# Patient Record
Sex: FEMALE | Race: WHITE | NOT HISPANIC OR LATINO | URBAN - METROPOLITAN AREA
[De-identification: names, ages, dates, MRNs, and addresses within clinical notes are randomized per-mention and may not be internally consistent; named-entity substitution may affect disease eponyms.]

---

## 2018-07-26 ENCOUNTER — OFFICE VISIT - HEALTHEAST (OUTPATIENT)
Dept: FAMILY MEDICINE | Facility: CLINIC | Age: 38
End: 2018-07-26

## 2018-07-26 ENCOUNTER — HOSPITAL ENCOUNTER (OUTPATIENT)
Dept: CT IMAGING | Facility: HOSPITAL | Age: 38
Discharge: HOME | End: 2018-07-26
Attending: FAMILY MEDICINE

## 2018-07-26 DIAGNOSIS — R10.9 LEFT FLANK PAIN: ICD-10-CM

## 2018-07-26 DIAGNOSIS — N20.0 NEPHROLITHIASIS: ICD-10-CM

## 2018-07-26 DIAGNOSIS — R31.0 GROSS HEMATURIA: ICD-10-CM

## 2018-07-26 DIAGNOSIS — Z23 NEED FOR VACCINATION: ICD-10-CM

## 2018-07-26 LAB
ALBUMIN SERPL-MCNC: 4.2 G/DL (ref 3.5–5)
ALBUMIN UR-MCNC: NEGATIVE MG/DL
ALP SERPL-CCNC: 74 U/L (ref 45–120)
ALT SERPL W P-5'-P-CCNC: 14 U/L (ref 0–45)
ANION GAP SERPL CALCULATED.3IONS-SCNC: 10 MMOL/L (ref 5–18)
APPEARANCE UR: CLEAR
AST SERPL W P-5'-P-CCNC: 18 U/L (ref 0–40)
BASOPHILS # BLD AUTO: 0.1 THOU/UL (ref 0–0.2)
BASOPHILS NFR BLD AUTO: 1 % (ref 0–2)
BILIRUB SERPL-MCNC: 0.9 MG/DL (ref 0–1)
BILIRUB UR QL STRIP: NEGATIVE
BUN SERPL-MCNC: 10 MG/DL (ref 8–22)
CALCIUM SERPL-MCNC: 9.7 MG/DL (ref 8.5–10.5)
CHLORIDE BLD-SCNC: 99 MMOL/L (ref 98–107)
CK SERPL-CCNC: 67 U/L (ref 30–190)
CO2 SERPL-SCNC: 28 MMOL/L (ref 22–31)
COLOR UR AUTO: YELLOW
CREAT SERPL-MCNC: 1.43 MG/DL (ref 0.6–1.1)
EOSINOPHIL # BLD AUTO: 0.2 THOU/UL (ref 0–0.4)
EOSINOPHIL NFR BLD AUTO: 1 % (ref 0–6)
ERYTHROCYTE [DISTWIDTH] IN BLOOD BY AUTOMATED COUNT: 10.8 % (ref 11–14.5)
GFR SERPL CREATININE-BSD FRML MDRD: 41 ML/MIN/1.73M2
GLUCOSE BLD-MCNC: 96 MG/DL (ref 70–125)
GLUCOSE UR STRIP-MCNC: NEGATIVE MG/DL
HCT VFR BLD AUTO: 41.1 % (ref 35–47)
HGB BLD-MCNC: 13.2 G/DL (ref 12–16)
HGB UR QL STRIP: NEGATIVE
KETONES UR STRIP-MCNC: ABNORMAL MG/DL
LEUKOCYTE ESTERASE UR QL STRIP: NEGATIVE
LIPASE SERPL-CCNC: 16 U/L (ref 0–52)
LYMPHOCYTES # BLD AUTO: 1.4 THOU/UL (ref 0.8–4.4)
LYMPHOCYTES NFR BLD AUTO: 10 % (ref 20–40)
MCH RBC QN AUTO: 30.7 PG (ref 27–34)
MCHC RBC AUTO-ENTMCNC: 32.2 G/DL (ref 32–36)
MCV RBC AUTO: 95 FL (ref 80–100)
MONOCYTES # BLD AUTO: 0.9 THOU/UL (ref 0–0.9)
MONOCYTES NFR BLD AUTO: 6 % (ref 2–10)
NEUTROPHILS # BLD AUTO: 11.8 THOU/UL (ref 2–7.7)
NEUTROPHILS NFR BLD AUTO: 82 % (ref 50–70)
NITRATE UR QL: NEGATIVE
PH UR STRIP: 6 [PH] (ref 5–8)
PLATELET # BLD AUTO: 197 THOU/UL (ref 140–440)
PMV BLD AUTO: 10 FL (ref 7–10)
POTASSIUM BLD-SCNC: 4.7 MMOL/L (ref 3.5–5)
PROT SERPL-MCNC: 7.1 G/DL (ref 6–8)
RBC # BLD AUTO: 4.31 MILL/UL (ref 3.8–5.4)
SODIUM SERPL-SCNC: 137 MMOL/L (ref 136–145)
SP GR UR STRIP: <=1.005 (ref 1–1.03)
UROBILINOGEN UR STRIP-ACNC: ABNORMAL
WBC: 14.3 THOU/UL (ref 4–11)

## 2018-07-26 ASSESSMENT — MIFFLIN-ST. JEOR: SCORE: 1716.01

## 2018-07-27 ENCOUNTER — COMMUNICATION - HEALTHEAST (OUTPATIENT)
Dept: FAMILY MEDICINE | Facility: CLINIC | Age: 38
End: 2018-07-27

## 2018-07-27 ENCOUNTER — COMMUNICATION - HEALTHEAST (OUTPATIENT)
Dept: UROLOGY | Facility: CLINIC | Age: 38
End: 2018-07-27

## 2018-07-30 ENCOUNTER — OFFICE VISIT - HEALTHEAST (OUTPATIENT)
Dept: UROLOGY | Facility: CLINIC | Age: 38
End: 2018-07-30

## 2018-07-30 DIAGNOSIS — N13.2 HYDRONEPHROSIS WITH URINARY OBSTRUCTION DUE TO URETERAL CALCULUS: ICD-10-CM

## 2018-07-30 DIAGNOSIS — N20.1 CALCULUS OF URETER: ICD-10-CM

## 2018-07-30 DIAGNOSIS — N20.0 CALCULUS OF KIDNEY: ICD-10-CM

## 2018-07-30 DIAGNOSIS — N20.9 URINARY TRACT STONES: ICD-10-CM

## 2018-07-30 LAB
ALBUMIN UR-MCNC: NEGATIVE MG/DL
APPEARANCE UR: CLEAR
BILIRUB UR QL STRIP: NEGATIVE
COLOR UR AUTO: YELLOW
GLUCOSE UR STRIP-MCNC: NEGATIVE MG/DL
HGB UR QL STRIP: NEGATIVE
KETONES UR STRIP-MCNC: NEGATIVE MG/DL
LEUKOCYTE ESTERASE UR QL STRIP: NEGATIVE
NITRATE UR QL: NEGATIVE
PH UR STRIP: 7 [PH] (ref 5–8)
SP GR UR STRIP: <=1.005 (ref 1–1.03)
UROBILINOGEN UR STRIP-ACNC: NORMAL

## 2018-08-03 ENCOUNTER — SURGERY - HEALTHEAST (OUTPATIENT)
Dept: SURGERY | Facility: CLINIC | Age: 38
End: 2018-08-03

## 2018-08-03 ENCOUNTER — ANESTHESIA - HEALTHEAST (OUTPATIENT)
Dept: SURGERY | Facility: CLINIC | Age: 38
End: 2018-08-03

## 2018-08-03 ASSESSMENT — MIFFLIN-ST. JEOR: SCORE: 1702.06

## 2018-08-10 ENCOUNTER — AMBULATORY - HEALTHEAST (OUTPATIENT)
Dept: UROLOGY | Facility: CLINIC | Age: 38
End: 2018-08-10

## 2018-08-10 DIAGNOSIS — N13.2 HYDRONEPHROSIS WITH URINARY OBSTRUCTION DUE TO URETERAL CALCULUS: ICD-10-CM

## 2018-08-10 DIAGNOSIS — N20.1 CALCULUS OF URETER: ICD-10-CM

## 2018-08-10 LAB
ALBUMIN UR-MCNC: ABNORMAL MG/DL
APPEARANCE UR: ABNORMAL
BILIRUB UR QL STRIP: NEGATIVE
COLOR UR AUTO: ABNORMAL
GLUCOSE UR STRIP-MCNC: NEGATIVE MG/DL
HGB UR QL STRIP: ABNORMAL
KETONES UR STRIP-MCNC: NEGATIVE MG/DL
LEUKOCYTE ESTERASE UR QL STRIP: ABNORMAL
NITRATE UR QL: NEGATIVE
PH UR STRIP: 7 [PH] (ref 5–8)
SP GR UR STRIP: 1.02 (ref 1–1.03)
UROBILINOGEN UR STRIP-ACNC: ABNORMAL

## 2018-08-11 LAB — BACTERIA SPEC CULT: NO GROWTH

## 2018-09-10 ENCOUNTER — AMBULATORY - HEALTHEAST (OUTPATIENT)
Dept: LAB | Facility: CLINIC | Age: 38
End: 2018-09-10

## 2018-09-10 ENCOUNTER — HOSPITAL ENCOUNTER (OUTPATIENT)
Dept: CT IMAGING | Facility: CLINIC | Age: 38
Discharge: HOME | End: 2018-09-10
Attending: SPECIALIST

## 2018-09-10 ENCOUNTER — OFFICE VISIT - HEALTHEAST (OUTPATIENT)
Dept: UROLOGY | Facility: CLINIC | Age: 38
End: 2018-09-10

## 2018-09-10 DIAGNOSIS — N20.9 URINARY CALCULUS: ICD-10-CM

## 2018-09-10 DIAGNOSIS — N20.0 CALCULUS OF KIDNEY: ICD-10-CM

## 2018-09-10 DIAGNOSIS — N20.9 URINARY TRACT STONES: ICD-10-CM

## 2018-09-10 DIAGNOSIS — N20.1 CALCULUS OF URETER: ICD-10-CM

## 2018-09-10 LAB
ALBUMIN UR-MCNC: NEGATIVE MG/DL
APPEARANCE UR: CLEAR
BILIRUB UR QL STRIP: NEGATIVE
CALCIUM SERPL-MCNC: 9.1 MG/DL (ref 8.5–10.5)
CALCIUM, IONIZED MEASURED: 1.15 MMOL/L (ref 1.11–1.3)
COLOR UR AUTO: YELLOW
CREAT SERPL-MCNC: 0.74 MG/DL (ref 0.6–1.1)
GFR SERPL CREATININE-BSD FRML MDRD: >60 ML/MIN/1.73M2
GLUCOSE UR STRIP-MCNC: NEGATIVE MG/DL
HGB UR QL STRIP: NEGATIVE
ION CA PH 7.4: 1.11 MMOL/L (ref 1.11–1.3)
KETONES UR STRIP-MCNC: NEGATIVE MG/DL
LEUKOCYTE ESTERASE UR QL STRIP: NEGATIVE
NITRATE UR QL: NEGATIVE
PH UR STRIP: 7.5 [PH] (ref 5–8)
PH: 7.32 (ref 7.35–7.45)
PHOSPHATE SERPL-MCNC: 3 MG/DL (ref 2.5–4.5)
PTH-INTACT SERPL-MCNC: 109 PG/ML (ref 10–86)
SP GR UR STRIP: 1.01 (ref 1–1.03)
UROBILINOGEN UR STRIP-ACNC: NORMAL

## 2018-09-17 ENCOUNTER — AMBULATORY - HEALTHEAST (OUTPATIENT)
Dept: LAB | Facility: CLINIC | Age: 38
End: 2018-09-17

## 2018-09-17 DIAGNOSIS — N20.0 CALCULUS OF KIDNEY: ICD-10-CM

## 2018-09-18 LAB
CALCIUM 24H UR-MRATE: 378 MG/24HR (ref 20–275)
CHLORIDE 24H UR-SRATE: 125 MMOL/24HR (ref 110–250)
CITRATE 24H UR-MCNC: 273 MG/24HR (ref 278–1191)
CREATININE, 24 HR URINE - HISTORICAL: 1904 MG/24HR (ref 800–1800)
MAGNESIUM 24H UR-MRATE: 117 MG/24 HR (ref 75–150)
OXALATE MG/SPEC: 20.8 MG/24HR (ref 7–44)
PH UR STRIP: 6 [PH] (ref 4.5–8)
PHOSPHORUS URINE MG/SPEC: 939.2 MG/24HR (ref 400–1300)
POTASSIUM 24H UR-SCNC: 31 MMOL/24HR (ref 30–90)
SODIUM 24H UR-SRATE: 107 MMOL/24HR (ref 40–217)
SPECIMEN VOL UR: 1600 ML
URIC ACID URINE MG/SPEC: 614 MG/24HR (ref 250–750)

## 2018-09-24 ENCOUNTER — AMBULATORY - HEALTHEAST (OUTPATIENT)
Dept: LAB | Facility: CLINIC | Age: 38
End: 2018-09-24

## 2018-09-24 DIAGNOSIS — N20.0 CALCULUS OF KIDNEY: ICD-10-CM

## 2018-09-25 LAB
CALCIUM 24H UR-MRATE: 474 MG/24HR (ref 20–275)
CHLORIDE 24H UR-SRATE: 292 MMOL/24HR (ref 110–250)
CITRATE 24H UR-MCNC: 636 MG/24HR (ref 278–1191)
CREATININE, 24 HR URINE - HISTORICAL: 1943.5 MG/24HR (ref 800–1800)
MAGNESIUM 24H UR-MRATE: 122 MG/24 HR (ref 75–150)
OXALATE MG/SPEC: 41.2 MG/24HR (ref 7–44)
PH UR STRIP: 6.5 [PH] (ref 4.5–8)
PHOSPHORUS URINE MG/SPEC: 1285.7 MG/24HR (ref 400–1300)
POTASSIUM 24H UR-SCNC: 55 MMOL/24HR (ref 30–90)
SODIUM 24H UR-SRATE: 313 MMOL/24HR (ref 40–217)
SPECIMEN VOL UR: 2300 ML
URIC ACID URINE MG/SPEC: 570 MG/24HR (ref 250–750)

## 2018-10-01 ENCOUNTER — OFFICE VISIT - HEALTHEAST (OUTPATIENT)
Dept: UROLOGY | Facility: CLINIC | Age: 38
End: 2018-10-01

## 2018-10-01 DIAGNOSIS — N20.9 URINARY CALCULUS: ICD-10-CM

## 2018-10-01 DIAGNOSIS — R82.994 HYPERCALCIURIA: ICD-10-CM

## 2018-10-01 DIAGNOSIS — R82.998 HIGH URINE SODIUM: ICD-10-CM

## 2018-10-01 DIAGNOSIS — R34 LOW URINE OUTPUT: ICD-10-CM

## 2018-10-01 DIAGNOSIS — N20.9 CALCULUS OF URINARY TRACT: ICD-10-CM

## 2018-10-01 LAB
ALBUMIN UR-MCNC: NEGATIVE MG/DL
APPEARANCE UR: CLEAR
BILIRUB UR QL STRIP: NEGATIVE
COLOR UR AUTO: YELLOW
GLUCOSE UR STRIP-MCNC: NEGATIVE MG/DL
HGB UR QL STRIP: NEGATIVE
KETONES UR STRIP-MCNC: NEGATIVE MG/DL
LEUKOCYTE ESTERASE UR QL STRIP: ABNORMAL
NITRATE UR QL: NEGATIVE
PH UR STRIP: 7 [PH] (ref 5–8)
SP GR UR STRIP: 1.02 (ref 1–1.03)
UROBILINOGEN UR STRIP-ACNC: ABNORMAL

## 2018-10-29 ENCOUNTER — AMBULATORY - HEALTHEAST (OUTPATIENT)
Dept: LAB | Facility: CLINIC | Age: 38
End: 2018-10-29

## 2018-10-29 DIAGNOSIS — R82.994 HYPERCALCIURIA: ICD-10-CM

## 2018-10-29 DIAGNOSIS — R82.998 HIGH URINE SODIUM: ICD-10-CM

## 2018-10-30 LAB
CALCIUM 24H UR-MRATE: 244 MG/24HR (ref 20–275)
CHLORIDE 24H UR-SRATE: 72 MMOL/24HR (ref 110–250)
CITRATE 24H UR-MCNC: 778 MG/24HR (ref 278–1191)
CREATININE, 24 HR URINE - HISTORICAL: 1827.5 MG/24HR (ref 800–1800)
MAGNESIUM 24H UR-MRATE: 89 MG/24 HR (ref 75–150)
OXALATE MG/SPEC: 36.1 MG/24HR (ref 7–44)
PH UR STRIP: 6.5 [PH] (ref 4.5–8)
PHOSPHORUS URINE MG/SPEC: 722.5 MG/24HR (ref 400–1300)
POTASSIUM 24H UR-SCNC: 95 MMOL/24HR (ref 30–90)
SODIUM 24H UR-SRATE: ABNORMAL MMOL/24HR (ref 40–217)
SPECIMEN VOL UR: 2125 ML
URIC ACID URINE MG/SPEC: 616 MG/24HR (ref 250–750)

## 2018-10-31 ENCOUNTER — COMMUNICATION - HEALTHEAST (OUTPATIENT)
Dept: UROLOGY | Facility: CLINIC | Age: 38
End: 2018-10-31

## 2018-11-06 ENCOUNTER — OFFICE VISIT - HEALTHEAST (OUTPATIENT)
Dept: UROLOGY | Facility: CLINIC | Age: 38
End: 2018-11-06

## 2018-11-06 DIAGNOSIS — N20.9 URINARY TRACT STONES: ICD-10-CM

## 2018-11-06 DIAGNOSIS — R82.994 HYPERCALCIURIA: ICD-10-CM

## 2018-11-06 DIAGNOSIS — R82.998 HIGH URINE SODIUM: ICD-10-CM

## 2018-11-06 DIAGNOSIS — N20.0 CALCULUS OF KIDNEY: ICD-10-CM

## 2018-11-06 LAB
ALBUMIN UR-MCNC: NEGATIVE MG/DL
APPEARANCE UR: CLEAR
BILIRUB UR QL STRIP: NEGATIVE
COLOR UR AUTO: YELLOW
GLUCOSE UR STRIP-MCNC: NEGATIVE MG/DL
HGB UR QL STRIP: NEGATIVE
KETONES UR STRIP-MCNC: NEGATIVE MG/DL
LEUKOCYTE ESTERASE UR QL STRIP: NEGATIVE
NITRATE UR QL: NEGATIVE
PH UR STRIP: 6 [PH] (ref 5–8)
SP GR UR STRIP: 1.02 (ref 1–1.03)
UROBILINOGEN UR STRIP-ACNC: NORMAL

## 2020-02-24 ENCOUNTER — OFFICE VISIT - HEALTHEAST (OUTPATIENT)
Dept: UROLOGY | Facility: CLINIC | Age: 40
End: 2020-02-24

## 2020-02-24 ENCOUNTER — HOSPITAL ENCOUNTER (OUTPATIENT)
Dept: CT IMAGING | Facility: CLINIC | Age: 40
Discharge: HOME | End: 2020-02-24
Attending: PHYSICIAN ASSISTANT

## 2020-02-24 DIAGNOSIS — N20.0 CALCULUS OF KIDNEY: ICD-10-CM

## 2020-02-24 DIAGNOSIS — Z87.442 HISTORY OF KIDNEY STONES: ICD-10-CM

## 2020-02-24 LAB
ALBUMIN UR-MCNC: NEGATIVE MG/DL
APPEARANCE UR: CLEAR
BILIRUB UR QL STRIP: NEGATIVE
COLOR UR AUTO: YELLOW
GLUCOSE UR STRIP-MCNC: NEGATIVE MG/DL
HGB UR QL STRIP: ABNORMAL
KETONES UR STRIP-MCNC: NEGATIVE MG/DL
LEUKOCYTE ESTERASE UR QL STRIP: NEGATIVE
NITRATE UR QL: NEGATIVE
PH UR STRIP: 7 [PH] (ref 5–8)
SP GR UR STRIP: 1.02 (ref 1–1.03)
UROBILINOGEN UR STRIP-ACNC: ABNORMAL

## 2020-03-02 ENCOUNTER — AMBULATORY - HEALTHEAST (OUTPATIENT)
Dept: LAB | Facility: CLINIC | Age: 40
End: 2020-03-02

## 2020-03-02 DIAGNOSIS — N20.0 CALCULUS OF KIDNEY: ICD-10-CM

## 2020-03-02 LAB
MAGNESIUM 24H UR-MRATE: 165 MG/24 HR (ref 75–150)
MAGNESIUM UR-MCNC: 7.1 MG/DL
SPECIMEN VOL UR: 2325 ML

## 2020-03-04 LAB
CALCIUM 24H UR-MRATE: 419 MG/24HR (ref 20–275)
CHLORIDE 24H UR-SRATE: 112 MMOL/24HR (ref 110–250)
CITRATE 24H UR-MCNC: 535 MG/24HR
CREATININE, 24 HR URINE - HISTORICAL: 1876.3 MG/24HR
OXALATE MG/SPEC: 34.9 MG/24HR (ref 7–44)
PH UR STRIP: 6 [PH] (ref 4.5–8)
PHOSPHORUS URINE MG/SPEC: 1634.5 MG/24HR
POTASSIUM 24H UR-SCNC: 46 MMOL/24HR (ref 30–90)
SODIUM 24H UR-SRATE: 130 MMOL/24HR (ref 40–217)
SPECIMEN VOL UR: 2325 ML
URIC ACID URINE MG/SPEC: 684 MG/24HR (ref 250–750)

## 2020-03-09 ENCOUNTER — OFFICE VISIT - HEALTHEAST (OUTPATIENT)
Dept: UROLOGY | Facility: CLINIC | Age: 40
End: 2020-03-09

## 2020-03-09 DIAGNOSIS — R82.994 HYPERCALCIURIA: ICD-10-CM

## 2020-03-09 DIAGNOSIS — Z87.442 HISTORY OF KIDNEY STONES: ICD-10-CM

## 2020-03-09 DIAGNOSIS — N20.0 CALCULUS OF KIDNEY: ICD-10-CM

## 2020-03-09 LAB
ALBUMIN UR-MCNC: NEGATIVE MG/DL
APPEARANCE UR: CLEAR
BILIRUB UR QL STRIP: NEGATIVE
COLOR UR AUTO: YELLOW
GLUCOSE UR STRIP-MCNC: NEGATIVE MG/DL
HGB UR QL STRIP: ABNORMAL
KETONES UR STRIP-MCNC: NEGATIVE MG/DL
LEUKOCYTE ESTERASE UR QL STRIP: NEGATIVE
NITRATE UR QL: NEGATIVE
PH UR STRIP: 7 [PH] (ref 5–8)
SP GR UR STRIP: 1.01 (ref 1–1.03)
UROBILINOGEN UR STRIP-ACNC: ABNORMAL

## 2020-06-02 ENCOUNTER — COMMUNICATION - HEALTHEAST (OUTPATIENT)
Dept: UROLOGY | Facility: CLINIC | Age: 40
End: 2020-06-02

## 2020-06-02 DIAGNOSIS — R82.994 HYPERCALCIURIA: ICD-10-CM

## 2020-06-02 DIAGNOSIS — N20.0 CALCULUS OF KIDNEY: ICD-10-CM

## 2020-06-03 ENCOUNTER — COMMUNICATION - HEALTHEAST (OUTPATIENT)
Dept: UROLOGY | Facility: CLINIC | Age: 40
End: 2020-06-03

## 2020-06-03 DIAGNOSIS — N20.0 CALCULUS OF KIDNEY: ICD-10-CM

## 2020-06-03 DIAGNOSIS — R82.994 HYPERCALCIURIA: ICD-10-CM

## 2020-10-13 ENCOUNTER — COMMUNICATION - HEALTHEAST (OUTPATIENT)
Dept: UROLOGY | Facility: CLINIC | Age: 40
End: 2020-10-13

## 2020-10-14 ENCOUNTER — AMBULATORY - HEALTHEAST (OUTPATIENT)
Dept: LAB | Facility: HOSPITAL | Age: 40
End: 2020-10-14

## 2020-10-14 ENCOUNTER — COMMUNICATION - HEALTHEAST (OUTPATIENT)
Dept: UROLOGY | Facility: CLINIC | Age: 40
End: 2020-10-14

## 2020-10-14 DIAGNOSIS — R82.994 HYPERCALCIURIA: ICD-10-CM

## 2020-10-14 DIAGNOSIS — N20.0 CALCULUS OF KIDNEY: ICD-10-CM

## 2020-10-14 DIAGNOSIS — E87.6 HYPOKALEMIA: ICD-10-CM

## 2020-10-14 LAB
ALBUMIN SERPL-MCNC: 4.2 G/DL (ref 3.5–5)
ANION GAP SERPL CALCULATED.3IONS-SCNC: 13 MMOL/L (ref 5–18)
BUN SERPL-MCNC: 10 MG/DL (ref 8–22)
CALCIUM SERPL-MCNC: 9.3 MG/DL (ref 8.5–10.5)
CHLORIDE BLD-SCNC: 97 MMOL/L (ref 98–107)
CO2 SERPL-SCNC: 29 MMOL/L (ref 22–31)
CREAT SERPL-MCNC: 0.89 MG/DL (ref 0.6–1.1)
GFR SERPL CREATININE-BSD FRML MDRD: >60 ML/MIN/1.73M2
GLUCOSE BLD-MCNC: 119 MG/DL (ref 70–125)
MAGNESIUM 24H UR-MRATE: 183 MG/24 HR (ref 75–150)
MAGNESIUM UR-MCNC: 8.5 MG/DL
PHOSPHATE SERPL-MCNC: 2.6 MG/DL (ref 2.5–4.5)
POTASSIUM BLD-SCNC: 2.8 MMOL/L (ref 3.5–5)
SODIUM SERPL-SCNC: 139 MMOL/L (ref 136–145)
SPECIMEN VOL UR: 2150 ML

## 2020-10-16 ENCOUNTER — AMBULATORY - HEALTHEAST (OUTPATIENT)
Dept: LAB | Facility: HOSPITAL | Age: 40
End: 2020-10-16

## 2020-10-16 DIAGNOSIS — E87.6 HYPOKALEMIA: ICD-10-CM

## 2020-10-16 LAB
ALBUMIN SERPL-MCNC: 3.7 G/DL (ref 3.5–5)
ANION GAP SERPL CALCULATED.3IONS-SCNC: 8 MMOL/L (ref 5–18)
BUN SERPL-MCNC: 9 MG/DL (ref 8–22)
CALCIUM SERPL-MCNC: 8.4 MG/DL (ref 8.5–10.5)
CHLORIDE BLD-SCNC: 103 MMOL/L (ref 98–107)
CO2 SERPL-SCNC: 30 MMOL/L (ref 22–31)
CREAT SERPL-MCNC: 0.75 MG/DL (ref 0.6–1.1)
GFR SERPL CREATININE-BSD FRML MDRD: >60 ML/MIN/1.73M2
GLUCOSE BLD-MCNC: 106 MG/DL (ref 70–125)
PHOSPHATE SERPL-MCNC: 1.8 MG/DL (ref 2.5–4.5)
POTASSIUM BLD-SCNC: 3.5 MMOL/L (ref 3.5–5)
SODIUM SERPL-SCNC: 141 MMOL/L (ref 136–145)

## 2020-10-20 LAB
CALCIUM 24H UR-MRATE: 239 MG/24HR (ref 20–275)
CHLORIDE 24H UR-SRATE: 103 MMOL/24HR (ref 110–250)
CITRATE 24H UR-MCNC: 79 MG/24HR
CREATININE, 24 HR URINE - HISTORICAL: 1588.9 MG/24HR
OXALATE MG/SPEC: 36.3 MG/24HR (ref 7–44)
PH UR STRIP: 7 [PH] (ref 4.5–8)
PHOSPHORUS URINE MG/SPEC: 931 MG/24HR
POTASSIUM 24H UR-SCNC: 59 MMOL/24HR (ref 30–90)
SODIUM 24H UR-SRATE: 71 MMOL/24HR (ref 40–217)
SPECIMEN VOL UR: 2150 ML
URIC ACID URINE MG/SPEC: 628 MG/24HR (ref 250–750)

## 2020-10-21 ENCOUNTER — OFFICE VISIT - HEALTHEAST (OUTPATIENT)
Dept: UROLOGY | Facility: CLINIC | Age: 40
End: 2020-10-21

## 2020-10-21 DIAGNOSIS — E87.6 DRUG-INDUCED HYPOKALEMIA: ICD-10-CM

## 2020-10-21 DIAGNOSIS — R82.994 HYPERCALCIURIA: ICD-10-CM

## 2020-10-21 DIAGNOSIS — T50.905A DRUG-INDUCED HYPOKALEMIA: ICD-10-CM

## 2020-10-21 DIAGNOSIS — N20.0 CALCULUS OF KIDNEY: ICD-10-CM

## 2020-10-21 DIAGNOSIS — R82.991 HYPOCITRATURIA: ICD-10-CM

## 2020-10-28 ENCOUNTER — AMBULATORY - HEALTHEAST (OUTPATIENT)
Dept: UROLOGY | Facility: CLINIC | Age: 40
End: 2020-10-28

## 2020-10-28 ENCOUNTER — AMBULATORY - HEALTHEAST (OUTPATIENT)
Dept: LAB | Facility: HOSPITAL | Age: 40
End: 2020-10-28

## 2020-10-28 ENCOUNTER — COMMUNICATION - HEALTHEAST (OUTPATIENT)
Dept: UROLOGY | Facility: CLINIC | Age: 40
End: 2020-10-28

## 2020-10-28 DIAGNOSIS — E87.6 HYPOKALEMIA: ICD-10-CM

## 2020-10-28 DIAGNOSIS — R82.991 HYPOCITRATURIA: ICD-10-CM

## 2020-10-28 LAB
ALBUMIN SERPL-MCNC: 4.2 G/DL (ref 3.5–5)
ANION GAP SERPL CALCULATED.3IONS-SCNC: 10 MMOL/L (ref 5–18)
BUN SERPL-MCNC: 12 MG/DL (ref 8–22)
CALCIUM SERPL-MCNC: 9.3 MG/DL (ref 8.5–10.5)
CHLORIDE BLD-SCNC: 99 MMOL/L (ref 98–107)
CO2 SERPL-SCNC: 29 MMOL/L (ref 22–31)
CREAT SERPL-MCNC: 0.88 MG/DL (ref 0.6–1.1)
GFR SERPL CREATININE-BSD FRML MDRD: >60 ML/MIN/1.73M2
GLUCOSE BLD-MCNC: 143 MG/DL (ref 70–125)
PHOSPHATE SERPL-MCNC: 3.1 MG/DL (ref 2.5–4.5)
POTASSIUM BLD-SCNC: 3 MMOL/L (ref 3.5–5)
SODIUM SERPL-SCNC: 138 MMOL/L (ref 136–145)

## 2020-11-03 ENCOUNTER — AMBULATORY - HEALTHEAST (OUTPATIENT)
Dept: LAB | Facility: HOSPITAL | Age: 40
End: 2020-11-03

## 2020-11-03 DIAGNOSIS — E87.6 HYPOKALEMIA: ICD-10-CM

## 2020-11-03 LAB — POTASSIUM BLD-SCNC: 3.4 MMOL/L (ref 3.5–5)

## 2020-11-16 ENCOUNTER — AMBULATORY - HEALTHEAST (OUTPATIENT)
Dept: LAB | Facility: HOSPITAL | Age: 40
End: 2020-11-16

## 2020-11-16 ENCOUNTER — AMBULATORY - HEALTHEAST (OUTPATIENT)
Dept: UROLOGY | Facility: CLINIC | Age: 40
End: 2020-11-16

## 2020-11-16 DIAGNOSIS — E87.6 HYPOKALEMIA: ICD-10-CM

## 2020-11-16 LAB — POTASSIUM BLD-SCNC: 3.6 MMOL/L (ref 3.5–5)

## 2020-11-17 ENCOUNTER — OFFICE VISIT - HEALTHEAST (OUTPATIENT)
Dept: UROLOGY | Facility: CLINIC | Age: 40
End: 2020-11-17

## 2020-11-17 DIAGNOSIS — R82.994 HYPERCALCIURIA: ICD-10-CM

## 2020-11-17 DIAGNOSIS — R82.991 HYPOCITRATURIA: ICD-10-CM

## 2020-11-17 DIAGNOSIS — N20.0 CALCULUS OF KIDNEY: ICD-10-CM

## 2020-11-17 DIAGNOSIS — E87.6 HYPOKALEMIA: ICD-10-CM

## 2020-12-02 ENCOUNTER — AMBULATORY - HEALTHEAST (OUTPATIENT)
Dept: LAB | Facility: HOSPITAL | Age: 40
End: 2020-12-02

## 2020-12-02 DIAGNOSIS — E87.6 HYPOKALEMIA: ICD-10-CM

## 2020-12-02 LAB
ALBUMIN SERPL-MCNC: 3.9 G/DL (ref 3.5–5)
ANION GAP SERPL CALCULATED.3IONS-SCNC: 9 MMOL/L (ref 5–18)
BUN SERPL-MCNC: 13 MG/DL (ref 8–22)
CALCIUM SERPL-MCNC: 8.8 MG/DL (ref 8.5–10.5)
CHLORIDE BLD-SCNC: 99 MMOL/L (ref 98–107)
CO2 SERPL-SCNC: 32 MMOL/L (ref 22–31)
CREAT SERPL-MCNC: 0.81 MG/DL (ref 0.6–1.1)
GFR SERPL CREATININE-BSD FRML MDRD: >60 ML/MIN/1.73M2
GLUCOSE BLD-MCNC: 89 MG/DL (ref 70–125)
PHOSPHATE SERPL-MCNC: 2.9 MG/DL (ref 2.5–4.5)
POTASSIUM BLD-SCNC: 3.5 MMOL/L (ref 3.5–5)
SODIUM SERPL-SCNC: 140 MMOL/L (ref 136–145)

## 2020-12-17 ENCOUNTER — COMMUNICATION - HEALTHEAST (OUTPATIENT)
Dept: UROLOGY | Facility: CLINIC | Age: 40
End: 2020-12-17

## 2020-12-17 DIAGNOSIS — E87.6 HYPOKALEMIA: ICD-10-CM

## 2020-12-22 ENCOUNTER — OFFICE VISIT - HEALTHEAST (OUTPATIENT)
Dept: UROLOGY | Facility: CLINIC | Age: 40
End: 2020-12-22

## 2020-12-22 DIAGNOSIS — R82.991 HYPOCITRATURIA: ICD-10-CM

## 2020-12-22 DIAGNOSIS — N20.0 CALCULUS OF KIDNEY: ICD-10-CM

## 2020-12-22 RX ORDER — POTASSIUM CITRATE 10 MEQ/1
20 TABLET, EXTENDED RELEASE ORAL DAILY
Qty: 360 TABLET | Refills: 0 | Status: SHIPPED
Start: 2020-12-22 | End: 2021-06-20

## 2021-05-27 VITALS — HEART RATE: 69 BPM | TEMPERATURE: 97.9 F | SYSTOLIC BLOOD PRESSURE: 146 MMHG | DIASTOLIC BLOOD PRESSURE: 107 MMHG

## 2021-06-01 VITALS — HEIGHT: 68 IN | BODY MASS INDEX: 33.65 KG/M2 | WEIGHT: 222.06 LBS

## 2021-06-01 VITALS — WEIGHT: 221.44 LBS | HEIGHT: 67 IN | BODY MASS INDEX: 34.75 KG/M2

## 2021-06-04 VITALS
HEART RATE: 60 BPM | DIASTOLIC BLOOD PRESSURE: 97 MMHG | SYSTOLIC BLOOD PRESSURE: 150 MMHG | BODY MASS INDEX: 35.71 KG/M2 | TEMPERATURE: 97.9 F | WEIGHT: 228 LBS

## 2021-06-06 NOTE — PROGRESS NOTES
Assessment/Plan:        Diagnoses and all orders for this visit:    Calculus of kidney  -     Magnesium, 24 Hour Urine; Future; Expected date: 04/08/2020  -     Stone Formation, 24 Hour Urine (does not include Magnesium); Future; Expected date: 04/08/2020  -     Renal Function Profile; Future; Expected date: 04/08/2020  -     chlorthalidone (HYGROTEN) 25 MG tablet; Take 1 tablet (25 mg total) by mouth daily.  Dispense: 30 tablet; Refill: 2  -     Patient Stated Goal: Prevent further stones  -     Chlorthalidone Education    History of kidney stones  -     Urinalysis Macroscopic    Hypercalciuria  -     Magnesium, 24 Hour Urine; Future; Expected date: 04/08/2020  -     Stone Formation, 24 Hour Urine (does not include Magnesium); Future; Expected date: 04/08/2020  -     Renal Function Profile; Future; Expected date: 04/08/2020  -     chlorthalidone (HYGROTEN) 25 MG tablet; Take 1 tablet (25 mg total) by mouth daily.  Dispense: 30 tablet; Refill: 2  -     Patient Stated Goal: Prevent further stones  -     Chlorthalidone Education  -     Ambulatory referral to Nutrition Services      Stone Management Plan  [facility] Stone Management 11/6/2018 2/24/2020 3/9/2020   Urinary Tract Infection No suspicion of infection No suspicion of infection No suspicion of infection   Renal Colic Asymptomatic at this time Asymptomatic at this time Asymptomatic at this time   Renal Failure No suspicion of renal failure No suspicion of renal failure No suspicion of renal failure   Current CT date - 2/24/2020 -   Right sided stones? - Yes -   R Number of ureteral stones - No ureteral stones -   R Number of kidney stones  - Renal stones unchanged from last exam -   R GSD of kidney stones - < 2 -   R Hydronephrosis - None -   R Stone Event No current event No current event No current event   R Current Plan Observe Observe -   Observe rationale Limited stone burden with good prognosis for spontaneous passage Limited stone burden with good prognosis  for spontaneous passage -   Left sided stones? - No -   L Number of ureteral stones - - -   L GSD of ureteral stones - - -   L Location of ureteral stone - - -   L Number of kidney stones  - - -   L GSD of kidney stones - - -   L Hydronephrosis - None -   L Stone Event No current event New stone passed prior to visit No current event   Diagnosis date - - -   Initial location of primary symptomatic stone - - -   Initial GSD of primary symptomatic stone - 5 -   Resolved date - 1/26/2020 -   Post-op status - - -   L Current Plan - - -   Clear rationale - - -             Subjective:      HPI  Ms. Kami Gilman is a 40 y.o.  female returning to the [facility] [place] for follow up of her stone disease.    She has provided a 24 hour urine specimen that demonstrates significant hypercalciuria (410) without high urine sodium.    Will start chlorthalidone 25 daily.     We discussed importance of maintaining low sodium diet. She would like to meet with a nutritionist to merge stone reduction with other dietary goals.    Will see her back with 24 hour urine on therapy     ROS   Review of systems is negative except for HPI.    Past Medical History:   Diagnosis Date     Kidney stone      Motion sickness        Past Surgical History:   Procedure Laterality Date     NO PAST SURGERIES       [facility] CYSTO/URETERO W/LITHOTRIPSY &INDWELL STENT INSRT Left 8/3/2018    Procedure: CYSTOSCOPY, LEFT URETEROSCOPY, LASER LITHOTRIPSY STENT INSERTION;  Surgeon: Prasanna Nelson MD;  Location: [facility];  Service: Urology     WISDOM TOOTH EXTRACTION         Current Outpatient Medications   Medication Sig Dispense Refill     chlorthalidone (HYGROTEN) 25 MG tablet Take 1 tablet (25 mg total) by mouth daily. 30 tablet 2     No current facility-administered medications for this visit.        Allergies   Allergen Reactions     Amoxicillin Rash       Social History     Socioeconomic History     Marital status:       Spouse name: Not on file     Number of children: Not on file     Years of education: Not on file     Highest education level: Not on file   Occupational History     Occupation: Graphic Design   Social Needs     Financial resource strain: Not on file     Food insecurity     Worry: Not on file     Inability: Not on file     Transportation needs     Medical: Not on file     Non-medical: Not on file   Tobacco Use     Smoking status: Never Smoker     Smokeless tobacco: Never Used   Substance and Sexual Activity     Alcohol use: Yes     Comment: socially 2 weekly     Drug use: No     Sexual activity: Not on file   Lifestyle     Physical activity     Days per week: Not on file     Minutes per session: Not on file     Stress: Not on file   Relationships     Social connections     Talks on phone: Not on file     Gets together: Not on file     Attends Mandaeism service: Not on file     Active member of club or organization: Not on file     Attends meetings of clubs or organizations: Not on file     Relationship status: Not on file     Intimate partner violence     Fear of current or ex partner: Not on file     Emotionally abused: Not on file     Physically abused: Not on file     Forced sexual activity: Not on file   Other Topics Concern     Not on file   Social History Narrative     Not on file       Family History   Problem Relation Age of Onset     Kidney cancer Maternal Aunt      Heart disease Paternal Grandmother      Urolithiasis Neg Hx      Clotting disorder Neg Hx      Gout Neg Hx      Objective:      Physical Exam  Vitals:    03/09/20 1545   BP: (!) 146/107   Pulse: 69   Temp: 97.9  F (36.6  C)     General - well developed, well nourished, appropriate for age. Appears no distress at this time  Abdomen - moderately obese soft, non-tender, no hepatosplenomegaly, no masses.   - no flank tenderness, no suprapubic tenderness, kidney and bladder non-palpable  MSK - normal spinal curvature. no spinal tenderness. normal  gait. muscular strength intact.  Psych - oriented to time, place, and person, normal mood and affect.      Labs   Urinalysis POC (Office):  Nitrite, UA   Date Value Ref Range Status   03/09/2020 Negative Negative Final   02/24/2020 Negative Negative Final   11/06/2018 Negative Negative Final       Lab Urinalysis:  Blood, UA   Date Value Ref Range Status   03/09/2020 Small (!) Negative Final   02/24/2020 Trace (!) Negative Final   11/06/2018 Negative Negative Final     Nitrite, UA   Date Value Ref Range Status   03/09/2020 Negative Negative Final   02/24/2020 Negative Negative Final   11/06/2018 Negative Negative Final     Leukocytes, UA   Date Value Ref Range Status   03/09/2020 Negative Negative Final   02/24/2020 Negative Negative Final   11/06/2018 Negative Negative Final     pH, UA   Date Value Ref Range Status   03/09/2020 7.0 5.0 - 8.0 Final   02/24/2020 7.0 5.0 - 8.0 Final   11/06/2018 6.0 5.0 - 8.0 Final    and Stone prevention labs   Serum chemistries   Creatinine   Date Value Ref Range Status   09/10/2018 0.74 0.60 - 1.10 mg/dL Final   07/26/2018 1.43 (H) 0.60 - 1.10 mg/dL Final     Potassium   Date Value Ref Range Status   07/26/2018 4.7 3.5 - 5.0 mmol/L Final     Calcium   Date Value Ref Range Status   09/10/2018 9.1 8.5 - 10.5 mg/dL Final   07/26/2018 9.7 8.5 - 10.5 mg/dL Final     Phosphorus   Date Value Ref Range Status   09/10/2018 3.0 2.5 - 4.5 mg/dL Final   , Calcium metabolism   Calcium, Ionized Measured   Date Value Ref Range Status   09/10/2018 1.15 1.11 - 1.30 mmol/L Final      PTH   Date Value Ref Range Status   09/10/2018 109 (H) 10 - 86 pg/mL Final     No results found for: [id number]  and 24 hour urine   Calcium, 24H Urine   Date Value Ref Range Status   03/02/2020 419 (H) 20 - 275 mg/24hr Final     Comment:     Hypercalciuria >350  Values are for persons with  average daily calcium intake  (600-800 mg/day)   10/29/2018 244 20 - 275 mg/24hr Final     Comment:       Hypercalciuria  ">350  Values are for persons with  average daily calcium intake  (600-800 mg/day)   09/24/2018 474 (H) 20 - 275 mg/24hr Final     Comment:       Hypercalciuria >350  Values are for persons with  average daily calcium intake  (600-800 mg/day)     Sodium, 24 Hour Urine   Date Value Ref Range Status   03/02/2020 130 40 - 217 mmol/24hr Final   10/29/2018  40 - 217 mmol/24hr Final     Comment:     \"Unable to calculate:  Urine Sodium value below detectable level\"     09/24/2018 313 (H) 40 - 217 mmol/24hr Final     Citrate, 24 Hour Urine   Date Value Ref Range Status   03/02/2020 535 285-1,191 mg/24hr Final     Comment:     Reference Ranges are not  established for 0-19 years  or >60 years of age.   10/29/2018 778 278 - 1,191 mg/24hr Final     Comment:       Reference Ranges are not  established for 0-19 years  or >60 years of age.   09/24/2018 636 278 - 1,191 mg/24hr Final     Comment:       Reference Ranges are not  established for 0-19 years  or >60 years of age.     Oxalate, 24 Hour Urine   Date Value Ref Range Status   03/02/2020 34.9 7.0 - 44.0 mg/24hr Final   10/29/2018 36.1 7.0 - 44.0 mg/24hr Final   09/24/2018 41.2 7.0 - 44.0 mg/24hr Final     pH, Urine   Date Value Ref Range Status   03/02/2020 6.0 4.5 - 8.0 Final   10/29/2018 6.5 4.5 - 8.0 Final   09/24/2018 6.5 4.5 - 8.0 Final     Urine Volume   Date Value Ref Range Status   03/02/2020 2,325 mL Final   10/29/2018 2,125 mL Final   09/24/2018 2,300 mL Final          "

## 2021-06-06 NOTE — PATIENT INSTRUCTIONS - HE
Patient Stated Goal: Prevent further stones  Chlorthalidone    This medication stimulates calcium reabsorption in the kidney, reducing the concentration of calcium in the urine.    USES:  To correct high urinary calcium (hypercalciuria) and prevent calcium stones.    HOW TO USE:  It is extremely important that patients keep close control of their dietary sodium while taking chlorthalidone. If the diet is too high in sodium, chlorthalidone will not be effective in decreasing calcium in the urine. In addition, a high sodium diet while taking chlorthalidone may cause the body to lose large amounts of potassium and stop making citrate.    *This medication can cause a decrease in blood potassium levels and can also reduce citrate levels in the urine. Therefore, potassium citrate is sometimes given with this medication to correct both the low potassium and citrate that can occur with chlorthalidone use.     SIDE EFFECTS:  Dizziness, lightheadedness, blurred vision, loss of appetite, itching, stomach upset, headache, weakness, or other potential side effects may occur as your body adjusts to this medication. If any of these effects persist or worsen, notify your doctor or pharmacist promptly.                                            *This is a summary and does not contain all possible information about this product. For complete information about this product or your specific health needs, ask your healthcare professional. Always seek the advice of your healthcare professional if you have any questions about this product or your medical condition.

## 2021-06-06 NOTE — PROGRESS NOTES
Assessment/Plan:        Diagnoses and all orders for this visit:    Calculus of kidney  -     Magnesium, 24 Hour Urine; Future; Expected date: 03/09/2020  -     Stone Formation, 24 Hour Urine (does not include Magnesium); Future; Expected date: 03/09/2020  -     Patient Stated Goal: Prevent further stones  -     24 Hour Urine Collection Steps Education    History of kidney stones  -     Urinalysis Macroscopic      Stone Management Plan  [facility] Stone Management 10/1/2018 11/6/2018 2/24/2020   Urinary Tract Infection No suspicion of infection No suspicion of infection No suspicion of infection   Renal Colic Asymptomatic at this time Asymptomatic at this time Asymptomatic at this time   Renal Failure No suspicion of renal failure No suspicion of renal failure No suspicion of renal failure   Current CT date - - 2/24/2020   Right sided stones? - - Yes   R Number of ureteral stones - - No ureteral stones   R Number of kidney stones  - - Renal stones unchanged from last exam   R GSD of kidney stones - - < 2   R Hydronephrosis - - None   R Stone Event No current event No current event No current event   R Current Plan Observe Observe Observe   Observe rationale Limited stone burden with good prognosis for spontaneous passage Limited stone burden with good prognosis for spontaneous passage Limited stone burden with good prognosis for spontaneous passage   Left sided stones? - - No   L Number of ureteral stones - - -   L GSD of ureteral stones - - -   L Location of ureteral stone - - -   L Number of kidney stones  - - -   L GSD of kidney stones - - -   L Hydronephrosis - - None   L Stone Event No current event No current event New stone passed prior to visit   Diagnosis date - - -   Initial location of primary symptomatic stone - - -   Initial GSD of primary symptomatic stone - - 5   Resolved date - - 1/26/2020   Post-op status - - -   L Current Plan - - -   Clear rationale - - -             Subjective:      HPI  Ms. Kami LEBLANC  Kalina is a 39 y.o.  female returning to the [facility] [place] for long-term stone management.     She is a first time calcium oxalate and calcium phosphate (apatite) stone former who has required stone clearance procedures. She has previously participated in stone risk evaluation and remains adherent to recommendations. She has identified modifiable stone risks including:  low urine volume and sodium dependent hypercalciuria. She has no identified non-modifiable stone risk factors.     She has passed a 5 mm stone 1/26/20 stone(s) without requiring medical attention. She had mild left flank pain and urinary urgency with the stone event which have both resolved. On occasion, she feels discomfort in her trunk on both sides. She is asymptomatic at present. She denies symptoms of fever, chills, flank pain, nausea, vomiting, urinary frequency and dysuria.    When asked, she states that she has had challenges adhering to risk reduction strategy but has maintained her effort. She rates her overall success as generally good. She has not required medical therapy.     New CT scan is personally reviewed and demonstrates stable stone disease. No change to tiny right lower pole renal stone. No left sided stones. no ureteral stones or hydronephrosis.     PLAN    39 yo F first time calcium phosphate stone former with previous risk factors sodium dependent hypercalciuria and low urine volume. Tiny right renal stone, asymptomatic. Recent left sided stone passage event ~ 1 month ago.    Stone patient state she passed last month would be a new stone from comparison imaging in 2018. Because of progression of stone disease, will modify current approach  repeat a single 24 hour urine collection with careful attention to accuracy.    Patient also seen and examined by Amy Lei PA-C       ROS   Review of systems is negative except for HPI.    Past Medical History:   Diagnosis Date     Kidney stone      Motion  sickness        Past Surgical History:   Procedure Laterality Date     NO PAST SURGERIES       [facility] CYSTO/URETERO W/LITHOTRIPSY &INDWELL STENT INSRT Left 8/3/2018    Procedure: CYSTOSCOPY, LEFT URETEROSCOPY, LASER LITHOTRIPSY STENT INSERTION;  Surgeon: Prasanna Nelson MD;  Location: [facility];  Service: Urology     WISDOM TOOTH EXTRACTION         No current outpatient medications on file.     No current facility-administered medications for this visit.        Allergies   Allergen Reactions     Amoxicillin Rash       Social History     Socioeconomic History     Marital status:      Spouse name: Not on file     Number of children: Not on file     Years of education: Not on file     Highest education level: Not on file   Occupational History     Occupation: Graphic Design   Social Needs     Financial resource strain: Not on file     Food insecurity:     Worry: Not on file     Inability: Not on file     Transportation needs:     Medical: Not on file     Non-medical: Not on file   Tobacco Use     Smoking status: Never Smoker     Smokeless tobacco: Never Used   Substance and Sexual Activity     Alcohol use: Yes     Comment: socially 2 weekly     Drug use: No     Sexual activity: Not on file   Lifestyle     Physical activity:     Days per week: Not on file     Minutes per session: Not on file     Stress: Not on file   Relationships     Social connections:     Talks on phone: Not on file     Gets together: Not on file     Attends Moravian service: Not on file     Active member of club or organization: Not on file     Attends meetings of clubs or organizations: Not on file     Relationship status: Not on file     Intimate partner violence:     Fear of current or ex partner: Not on file     Emotionally abused: Not on file     Physically abused: Not on file     Forced sexual activity: Not on file   Other Topics Concern     Not on file   Social History Narrative     Not on file       Family History   Problem  Relation Age of Onset     Kidney cancer Maternal Aunt      Heart disease Paternal Grandmother      Urolithiasis Neg Hx      Clotting disorder Neg Hx      Gout Neg Hx      Objective:      Physical Exam  Vitals:    02/24/20 1550   BP: (!) 150/97   Pulse: 60   Temp: 97.9  F (36.6  C)     General - well developed, well nourished, appropriate for age. Appears no distress at this time  Abdomen - mildly obese soft, non-tender, no hepatosplenomegaly, no masses.   - no flank tenderness, no suprapubic tenderness, kidney and bladder non-palpable  MSK - normal spinal curvature. no spinal tenderness. normal gait. muscular strength intact.  Psych - oriented to time, place, and person, normal mood and affect.    Labs   Urinalysis POC (Office):  Nitrite, UA   Date Value Ref Range Status   02/24/2020 Negative Negative Final   11/06/2018 Negative Negative Final   10/01/2018 Negative Negative Final       Lab Urinalysis:  Blood, UA   Date Value Ref Range Status   02/24/2020 Trace (!) Negative Final   11/06/2018 Negative Negative Final   10/01/2018 Negative Negative Final     Nitrite, UA   Date Value Ref Range Status   02/24/2020 Negative Negative Final   11/06/2018 Negative Negative Final   10/01/2018 Negative Negative Final     Leukocytes, UA   Date Value Ref Range Status   02/24/2020 Negative Negative Final   11/06/2018 Negative Negative Final   10/01/2018 Trace (!) Negative Final     pH, UA   Date Value Ref Range Status   02/24/2020 7.0 5.0 - 8.0 Final   11/06/2018 6.0 5.0 - 8.0 Final   10/01/2018 7.0 5.0 - 8.0 Final

## 2021-06-06 NOTE — PATIENT INSTRUCTIONS - HE
Patient Stated Goal: Prevent further stones  Steps for collecting a 24 hour urine specimen    Please follow the directions carefully. All urine voided for a 24-hour period needs to be collected into the jug.  DO NOT change any of your  normal  daily habits when doing this test. Continue to follow your regular diet, intake of fluids, and usual activity level. Pick the most convenient day with your schedule, perhaps on a weekend or a day off.    Start your Diet Log the day before collection and continue on the day of urine collection.  You MUST bring Diet Log with you on follow up visit to discuss results.    One 24hr Urine Collection     Two 24hr Urine Collections  (do not collect on consecutive days)    PLEASE COMPLETE THE 2nd JUG WITHIN 1-2 WEEKS FROM THE 1st JUG    STEP 1  Empty your bladder completely into the toilet. This will be your start time. Write your full legal name, start date and time on the jug label.  Collection start and stop times need to match exactly!  For example:  6 am to 6 am.    STEP 2  The next time you urinate, empty your bladder directly into the jug or collection hat and pour urine into the jug.  Screw the lid back onto the jug.  Do not spill!    STEP 3  Place the jug in the refrigerator or a cooler with ice during the collection period.  Failure to keep it cool could cause inaccurate test results. DO NOT Freeze.    STEP 4  Continue collecting all urine into the jug for the rest of the day, for the full 24 hours.  DO NOT stop early or go over 24 hours!    STEP 5  Exactly 24 hours from start of collection, write your full legal name, stop date and time on the jug label.   Collection start and stop times need to match exactly!  For example:  6 am to 6 am.  Failure to label correctly will result in recollection of urine specimen.    STEP 6  Return each jug within 24 hours after final urination.     STEP 7  Drop off jug locations:   [facility]: Mon-Fri 7am-7pm - Closed on  weekends  [facility]: Mon-Fri 7am-5pm - Closed on Sunday  [facility]: Mon-Fri 7am-6:30pm - Closed on weekends    STEP 8  Please call [facility] after return of your final jug to schedule your follow-up visit. [phone]

## 2021-06-06 NOTE — PROGRESS NOTES
Patient presents to the office today for a long term yearly kidney stone follow up with a CT scan.

## 2021-06-08 NOTE — TELEPHONE ENCOUNTER
Refill request from [facility] for chlorthalidone.  One fill given with message for patient to complete urine testing for f/u.  Alexandra Sahu RN

## 2021-06-12 NOTE — TELEPHONE ENCOUNTER
Patient called stating that she has had some symptoms of nausea and possibly her heart 'skipping'.  She denies any shortness of breath or fever or pain.  She is concerned with her potassium level as it had been low previously.  She continues taking the potassium supplement and the chlorthalidone.  She will go in for a blood test today to re-check her potassium level.  Alexandra Sahu RN

## 2021-06-12 NOTE — TELEPHONE ENCOUNTER
Patient had blood work completed as part of overdue stone prevention testing after initiating thiazide.    K+ came back critically low at 2.8, still awaiting 24 hour urine results which patient has future telehealth appt set up to review    She has been feeling dizzy at times but otherwise no attributable side effects    Will have her hold her chlorthalidone for next 3 days and initiate K+ replacement protocol orally as ordered.    Will recheck K+ in next 1-2 days    Patient understands and agrees with plan

## 2021-06-12 NOTE — TELEPHONE ENCOUNTER
Received call from lab that patient's potassium result was 2.8.  Provider updated.    Alexandra Sahu RN

## 2021-06-12 NOTE — PROGRESS NOTES
Assessment/Plan:        Diagnoses and all orders for this visit:    Hypocitraturia  -     Patient Stated Goal: Prevent further stones  -     Potassium Citrate Education    Hypercalciuria  -     Patient Stated Goal: Prevent further stones  -     Chlorthalidone Education    Drug-induced hypokalemia  -     Potassium Citrate Education    Calculus of kidney  -     CT Abdomen Pelvis Without Oral Without IV Contrast; Future; Expected date: 04/19/2021    Stone Management Plan  [facility] Stone Management 11/6/2018 2/24/2020 3/9/2020   Urinary Tract Infection No suspicion of infection No suspicion of infection No suspicion of infection   Renal Colic Asymptomatic at this time Asymptomatic at this time Asymptomatic at this time   Renal Failure No suspicion of renal failure No suspicion of renal failure No suspicion of renal failure   Current CT date - 2/24/2020 -   Right sided stones? - Yes -   R Number of ureteral stones - No ureteral stones -   R Number of kidney stones  - Renal stones unchanged from last exam -   R GSD of kidney stones - < 2 -   R Hydronephrosis - None -   R Stone Event No current event No current event No current event   R Current Plan Observe Observe -   Observe rationale Limited stone burden with good prognosis for spontaneous passage Limited stone burden with good prognosis for spontaneous passage -   Left sided stones? - No -   L Number of ureteral stones - - -   L GSD of ureteral stones - - -   L Location of ureteral stone - - -   L Number of kidney stones  - - -   L GSD of kidney stones - - -   L Hydronephrosis - None -   L Stone Event No current event New stone passed prior to visit No current event   Diagnosis date - - -   Initial location of primary symptomatic stone - - -   Initial GSD of primary symptomatic stone - 5 -   Resolved date - 1/26/2020 -   Post-op status - - -   L Current Plan - - -   Clear rationale - - -         Phone call duration:15 minutes    Amy Lei PA-C     Subjective:      The  "patient has been notified of following:     \"This telephone visit will be conducted via a call between you and your physician/provider. We have found that certain health care needs can be provided without the need for a physical exam.  This service lets us provide the care you need with a short phone conversation.  If a prescription is necessary we can send it directly to your pharmacy.  If labs and/or imaging are needed, we can place orders so you can have the test (s) done at a later time.    If during the course of the call the physician/provider feels a telephone visit is not appropriate, you will not be charged for this service.\"     HPI  Ms. Kami Gilman is a 40 y.o.  female who is being evaluated via a billable telephone visit by [facility] for overdue prevention follow up.    She is a first time calcium oxalate and calcium phosphate (apatite) stone former who has required stone clearance procedures. She has previously participated in stone risk evaluation and remains adherent to recommendations. She has identified modifiable stone risks including:  thiazide responsive hypercalciuria and hypocitraturia. She has no identified non-modifiable stone risk factors.     She has done well since last encounter without any stone events. She is asymptomatic at present. She denies symptoms of fever, chills, flank pain, nausea, vomiting, urinary frequency and dysuria.    When asked, she states that she has been very rigorous in adherence to prescribed stone risk reduction strategy. She rates her overall success as generally good. She has been taking medications as prescribed and is tolerating well.     Evaluation of serum lab demonstrates normal serum potassium following aggressive supplementation (3.5, previously 2.8). One 24 hour urine collection from 10/14/20 demonstrates urine volume (2150 mL), creatinine (1589 mg), normalized hypercalciuria (239, previously 419 mg), sodium (71 " mmol), oxalate (36 mg), new, severely low citrate (79 mg) and pH (7). Dietary journal is not available for review.     PLAN    41 yo F first time calcium based kidney stone former with current stone risk factors of normalized hypercalciuria and new hypocitraturia, maintained on chlorthalidone. Corrected hypokalemia. Tiny right renal stone.    Given thiazide induced hypokalemia and new hypocitraturia, will initiate potassium citrate 20 mEq two times a day and continue chlorthalidone 25 mg daily. She will return with imaging in 6 months.     Will continue current strategy and return to clinic in 6 months with imaging and without 24 hour urine. Refills sent for chlorthalidone and potassium citrate.        ROS   Review of systems is negative except for HPI.    Past Medical History:   Diagnosis Date     Kidney stone      Motion sickness        Past Surgical History:   Procedure Laterality Date     NO PAST SURGERIES       [facility] CYSTO/URETERO W/LITHOTRIPSY &INDWELL STENT INSRT Left 8/3/2018    Procedure: CYSTOSCOPY, LEFT URETEROSCOPY, LASER LITHOTRIPSY STENT INSERTION;  Surgeon: Prasanna Nelson MD;  Location: [facility];  Service: Urology     WISDOM TOOTH EXTRACTION         Current Outpatient Medications   Medication Sig Dispense Refill     chlorthalidone (HYGROTEN) 25 MG tablet Take 1 tablet (25 mg total) by mouth daily. 30 tablet 0     potassium chloride (KLOR-CON) 10 MEQ CR tablet Take 2 tablets (20 mEq total) by mouth ever 2 hours for 4 doses, THEN 2 tablets (20 mEq total) 2 times a day for 5 days, THEN 2 tablets (20 Eq total) daily. 88 tablet 0     No current facility-administered medications for this visit.        Allergies   Allergen Reactions     Amoxicillin Rash       Social History     Socioeconomic History     Marital status:      Spouse name: Not on file     Number of children: Not on file     Years of education: Not on file     Highest education level: Not on file   Occupational History      Occupation: Graphic Design   Social Needs     Financial resource strain: Not on file     Food insecurity     Worry: Not on file     Inability: Not on file     Transportation needs     Medical: Not on file     Non-medical: Not on file   Tobacco Use     Smoking status: Never Smoker     Smokeless tobacco: Never Used   Substance and Sexual Activity     Alcohol use: Yes     Comment: socially 2 weekly     Drug use: No     Sexual activity: Not on file   Lifestyle     Physical activity     Days per week: Not on file     Minutes per session: Not on file     Stress: Not on file   Relationships     Social connections     Talks on phone: Not on file     Gets together: Not on file     Attends [employer] service: Not on file     Active member of club or organization: Not on file     Attends meetings of clubs or organizations: Not on file     Relationship status: Not on file     Intimate partner violence     Fear of current or ex partner: Not on file     Emotionally abused: Not on file     Physically abused: Not on file     Forced sexual activity: Not on file   Other Topics Concern     Not on file   Social History Narrative     Not on file       Family History   Problem Relation Age of Onset     Kidney cancer Maternal Aunt      Heart disease Paternal Grandmother      Urolithiasis Neg Hx      Clotting disorder Neg Hx      Gout Neg Hx        Objective:      Labs   Stone prevention labs   Serum chemistries   Creatinine   Date Value Ref Range Status   10/16/2020 0.75 0.60 - 1.10 mg/dL Final   10/14/2020 0.89 0.60 - 1.10 mg/dL Final   09/10/2018 0.74 0.60 - 1.10 mg/dL Final     Potassium   Date Value Ref Range Status   10/16/2020 3.5 3.5 - 5.0 mmol/L Final   10/14/2020 2.8 (LL) 3.5 - 5.0 mmol/L Final   07/26/2018 4.7 3.5 - 5.0 mmol/L Final     Calcium   Date Value Ref Range Status   10/16/2020 8.4 (L) 8.5 - 10.5 mg/dL Final   10/14/2020 9.3 8.5 - 10.5 mg/dL Final   09/10/2018 9.1 8.5 - 10.5 mg/dL Final     Phosphorus   Date Value Ref  "Range Status   10/16/2020 1.8 (L) 2.5 - 4.5 mg/dL Final   10/14/2020 2.6 2.5 - 4.5 mg/dL Final   09/10/2018 3.0 2.5 - 4.5 mg/dL Final    and 24 hour urine   Calcium, 24H Urine   Date Value Ref Range Status   10/14/2020 239 20 - 275 mg/24hr Final     Comment:     Hypercalciuria >350  Values are for persons with  average daily calcium intake  (600-800 mg/day)   03/02/2020 419 (H) 20 - 275 mg/24hr Final     Comment:     Hypercalciuria >350  Values are for persons with  average daily calcium intake  (600-800 mg/day)   10/29/2018 244 20 - 275 mg/24hr Final     Comment:       Hypercalciuria >350  Values are for persons with  average daily calcium intake  (600-800 mg/day)     Sodium, 24 Hour Urine   Date Value Ref Range Status   10/14/2020 71 40 - 217 mmol/24hr Final   03/02/2020 130 40 - 217 mmol/24hr Final   10/29/2018  40 - 217 mmol/24hr Final     Comment:     \"Unable to calculate:  Urine Sodium value below detectable level\"       Citrate, 24 Hour Urine   Date Value Ref Range Status   10/14/2020 79 (L) 292-1,191 mg/24hr Final     Comment:     Reference Ranges are not  established for 0-19 years  or >60 years of age.   03/02/2020 535 285-1,191 mg/24hr Final     Comment:     Reference Ranges are not  established for 0-19 years  or >60 years of age.   10/29/2018 778 278 - 1,191 mg/24hr Final     Comment:       Reference Ranges are not  established for 0-19 years  or >60 years of age.     Oxalate, 24 Hour Urine   Date Value Ref Range Status   10/14/2020 36.3 7.0 - 44.0 mg/24hr Final   03/02/2020 34.9 7.0 - 44.0 mg/24hr Final   10/29/2018 36.1 7.0 - 44.0 mg/24hr Final     pH, Urine   Date Value Ref Range Status   10/14/2020 7.0 4.5 - 8.0 Final   03/02/2020 6.0 4.5 - 8.0 Final   10/29/2018 6.5 4.5 - 8.0 Final     Urine Volume   Date Value Ref Range Status   10/14/2020 2,150 mL Final   03/02/2020 2,325 mL Final   10/29/2018 2,125 mL Final     "

## 2021-06-12 NOTE — PATIENT INSTRUCTIONS - HE
Patient Stated Goal: Prevent further stones  Potassium Citrate    This medication can be used in two ways to help stone formers.      Citrate in the urine makes it harder for stones to form and grow. Many stone patients do not have enough citrate in their urine and require a supplement.        Citrate is an effective way to make the urine less acidic. Uric acid stones only form when the urine is acidic and may dissolve if urine acidity can be reversed.      USES: This medication is used for low urinary citrate (hypocitraturia), renal tubular acidosis, uric acid stones, calcium oxalate stones, and cysteine stones.    HOW TO USE:     Potassium citrate comes in several different forms      Tablet - made of potassium citrate in a wax substance to allow delayed absorption. It is common to see a  ghost tablet  in the stool after all the potassium citrate has been absorbed. This is more common in people with diarrhea or other bowel disorders.      Powder - this can be dissolved in water or other clear fluids. To decrease stomach irritation and improve absorption, we recommend dissolving in at least 500 cc of fluid and drinking slowly. You do not have to drink this medication all at once. Many patients find the taste is improved by adding a sweetener.      Liquid - Similar to the powder, the liquid formulation is best dissolved in a large container of fluid and consumed slowly.     SIDE EFFECTS:  The primary side effect of this medication is stomach irritation, less so with the tablet. Some people find stomach irritation decreased by taking the medication with food and avoiding lying down for at least a half hour after consumption.    If you develop diarrhea, nausea, vomiting, stomach pain, fluid retention, convulsions, unusual weakness, mental confusion, tingling or numbness of the hands or feet or other potential side effects, notify your doctor or pharmacist promptly.                    *This is a summary and does not  contain all possible information about this product. For complete information about this product or your specific health needs, ask your healthcare professional. Always seek the advice of your healthcare professional if you have any questions about this product or your medical condition.  Chlorthalidone    This medication stimulates calcium reabsorption in the kidney, reducing the concentration of calcium in the urine.    USES:  To correct high urinary calcium (hypercalciuria) and prevent calcium stones.    HOW TO USE:  It is extremely important that patients keep close control of their dietary sodium while taking chlorthalidone. If the diet is too high in sodium, chlorthalidone will not be effective in decreasing calcium in the urine. In addition, a high sodium diet while taking chlorthalidone may cause the body to lose large amounts of potassium and stop making citrate.    *This medication can cause a decrease in blood potassium levels and can also reduce citrate levels in the urine. Therefore, potassium citrate is sometimes given with this medication to correct both the low potassium and citrate that can occur with chlorthalidone use.     SIDE EFFECTS:  Dizziness, lightheadedness, blurred vision, loss of appetite, itching, stomach upset, headache, weakness, or other potential side effects may occur as your body adjusts to this medication. If any of these effects persist or worsen, notify your doctor or pharmacist promptly.                                            *This is a summary and does not contain all possible information about this product. For complete information about this product or your specific health needs, ask your healthcare professional. Always seek the advice of your healthcare professional if you have any questions about this product or your medical condition.

## 2021-06-13 NOTE — PROGRESS NOTES
"Assessment/Plan:        Diagnoses and all orders for this visit:    Hypokalemia  -     potassium chloride (KLOR-CON) 10 MEQ CR tablet; Take 2 tablets (20 mEq total) by mouth ever 2 hours for 4 doses, THEN 2 tablets (20 mEq total) 2 times a day for 5 days, THEN 2 tablets (20 Eq total) daily.  Dispense: 88 tablet; Refill: 0    Hypocitraturia    Calculus of kidney    Hypercalciuria      Stone Management Plan  [facility] Stone Management 2/24/2020 3/9/2020 10/21/2020   Urinary Tract Infection No suspicion of infection No suspicion of infection No suspicion of infection   Renal Colic Asymptomatic at this time Asymptomatic at this time Asymptomatic at this time   Renal Failure No suspicion of renal failure No suspicion of renal failure No suspicion of renal failure   Current CT date 2/24/2020 - -   Right sided stones? Yes - -   R Number of ureteral stones No ureteral stones - -   R Number of kidney stones  Renal stones unchanged from last exam - -   R GSD of kidney stones < 2 - -   R Hydronephrosis None - -   R Stone Event No current event No current event No current event   R Current Plan Observe - Observe   Observe rationale Limited stone burden with good prognosis for spontaneous passage - Limited stone burden with good prognosis for spontaneous passage   Left sided stones? No - -   L Number of ureteral stones - - -   L GSD of ureteral stones - - -   L Location of ureteral stone - - -   L Number of kidney stones  - - -   L GSD of kidney stones - - -   L Hydronephrosis None - -   L Stone Event New stone passed prior to visit No current event No current event   Diagnosis date - - -   Initial location of primary symptomatic stone - - -   Initial GSD of primary symptomatic stone 5 - -   Resolved date 1/26/2020 - -   Post-op status - - -   L Current Plan - - -   Clear rationale - - -             Phone call duration: 14 minutes    Amy Lei PA-C     Subjective:      The patient has been notified of following:     \"This telephone " "visit will be conducted via a call between you and your physician/provider. We have found that certain health care needs can be provided without the need for a physical exam.  This service lets us provide the care you need with a short phone conversation.  If a prescription is necessary we can send it directly to your pharmacy.  If labs and/or imaging are needed, we can place orders so you can have the test (s) done at a later time.    If during the course of the call the physician/provider feels a telephone visit is not appropriate, you will not be charged for this service.\"     HPI  Ms. Kami Gilman is a 40 y.o.  female who is being evaluated via a billable telephone visit by [facility] [place] for unanticipated visit with acute exacerbation of chronic stone disease.      She is a first time calcium oxalate and calcium phosphate (apatite) stone former who has required stone clearance procedures. She has previously participated in stone risk evaluation and remains adherent to recommendations. She has identified modifiable stone risks including:  thiazide responsive hypercalciuria and hypocitraturia. She has no identified non-modifiable stone risk factors.    She has been in contact with our office via [facility] for follow up of thiazide induced hypokalemia, initiated on potassium chloride taper. She was also initiated potassium citrate 20 mEq two times a day given new hypocitraturia.     Potassium level has slowly increased but fluctuated with last level from 11/16/20 at 3.6. She ran out of the potassium chloride 2-3 days ago. She continues on potassium citrate 20 mEq two times a day and chlorthalidone 25 mg daily.    She had tolerated things well but on occasion, notices a fluttering in her chest. She is able to continue with exercise and activities. No associated lightheadedness, dizziness, muscle aches or chest pain.    PLAN    41 yo F first time calcium based kidney stone former " with thiazide responsive hypercalciuria, new hypocitraturia, maintained on chlorthalidone. Hypokalemia, on potassium supplement. Tiny right renal stone.    I recommend she hold the chlorthalidone for 5 days as potassium level probably has not been able to reach baseline. She will continue potassium citrate 20 mEq two times a day. Will restart potassium chloride taper dose and then remain on 20 mEq daily along with potassium citrate. She will obtain updated renal panel ~ 1 week after restarting chlorthalidone. Will return in ~ 3-4 weeks with updated 24 hour urine specimen.       ROS   Review of systems is negative except for HPI.    Past Medical History:   Diagnosis Date     Kidney stone      Motion sickness        Past Surgical History:   Procedure Laterality Date     NO PAST SURGERIES       [facility] CYSTO/URETERO W/LITHOTRIPSY &INDWELL STENT INSRT Left 8/3/2018    Procedure: CYSTOSCOPY, LEFT URETEROSCOPY, LASER LITHOTRIPSY STENT INSERTION;  Surgeon: Prasanna Nelson MD;  Location: [facility];  Service: Urology     WISDOM TOOTH EXTRACTION         Current Outpatient Medications   Medication Sig Dispense Refill     chlorthalidone (HYGROTEN) 25 MG tablet Take 1 tablet (25 mg total) by mouth daily. 180 tablet 1     potassium citrate (UROCIT-K) 10 mEq (1,080 mg) SR tablet Take 2 tablets (20 mEq total) by mouth 2 (two) times a day. 720 tablet 1     potassium chloride (KLOR-CON) 10 MEQ CR tablet Take 2 tablets (20 mEq total) by mouth ever 2 hours for 4 doses, THEN 2 tablets (20 mEq total) 2 times a day for 5 days, THEN 2 tablets (20 Eq total) daily. 88 tablet 0     No current facility-administered medications for this visit.        Allergies   Allergen Reactions     Amoxicillin Rash       Social History     Socioeconomic History     Marital status:      Spouse name: Not on file     Number of children: Not on file     Years of education: Not on file     Highest education level: Not on file   Occupational History      Occupation: Graphic Design   Social Needs     Financial resource strain: Not on file     Food insecurity     Worry: Not on file     Inability: Not on file     Transportation needs     Medical: Not on file     Non-medical: Not on file   Tobacco Use     Smoking status: Never Smoker     Smokeless tobacco: Never Used   Substance and Sexual Activity     Alcohol use: Yes     Comment: socially 2 weekly     Drug use: No     Sexual activity: Not on file   Lifestyle     Physical activity     Days per week: Not on file     Minutes per session: Not on file     Stress: Not on file   Relationships     Social connections     Talks on phone: Not on file     Gets together: Not on file     Attends Jainism service: Not on file     Active member of club or organization: Not on file     Attends meetings of clubs or organizations: Not on file     Relationship status: Not on file     Intimate partner violence     Fear of current or ex partner: Not on file     Emotionally abused: Not on file     Physically abused: Not on file     Forced sexual activity: Not on file   Other Topics Concern     Not on file   Social History Narrative     Not on file       Family History   Problem Relation Age of Onset     Kidney cancer Maternal Aunt      Heart disease Paternal Grandmother      Urolithiasis Neg Hx      Clotting disorder Neg Hx      Gout Neg Hx        Objective:      Labs   Acute Labs   Renal Panel  [facility]  Creatinine   Date Value Ref Range Status   10/28/2020 0.88 0.60 - 1.10 mg/dL Final   10/16/2020 0.75 0.60 - 1.10 mg/dL Final   10/14/2020 0.89 0.60 - 1.10 mg/dL Final     Potassium   Date Value Ref Range Status   11/16/2020 3.6 3.5 - 5.0 mmol/L Final   11/03/2020 3.4 (L) 3.5 - 5.0 mmol/L Final   10/28/2020 3.0 (L) 3.5 - 5.0 mmol/L Final     Calcium   Date Value Ref Range Status   10/28/2020 9.3 8.5 - 10.5 mg/dL Final   10/16/2020 8.4 (L) 8.5 - 10.5 mg/dL Final   10/14/2020 9.3 8.5 - 10.5 mg/dL Final

## 2021-06-13 NOTE — PATIENT INSTRUCTIONS - HE
Potassium Citrate    This medication can be used in two ways to help stone formers.      Citrate in the urine makes it harder for stones to form and grow. Many stone patients do not have enough citrate in their urine and require a supplement.        Citrate is an effective way to make the urine less acidic. Uric acid stones only form when the urine is acidic and may dissolve if urine acidity can be reversed.      USES: This medication is used for low urinary citrate (hypocitraturia), renal tubular acidosis, uric acid stones, calcium oxalate stones, and cysteine stones.    HOW TO USE:     Potassium citrate comes in several different forms      Tablet - made of potassium citrate in a wax substance to allow delayed absorption. It is common to see a  ghost tablet  in the stool after all the potassium citrate has been absorbed. This is more common in people with diarrhea or other bowel disorders.      Powder - this can be dissolved in water or other clear fluids. To decrease stomach irritation and improve absorption, we recommend dissolving in at least 500 cc of fluid and drinking slowly. You do not have to drink this medication all at once. Many patients find the taste is improved by adding a sweetener.      Liquid - Similar to the powder, the liquid formulation is best dissolved in a large container of fluid and consumed slowly.     SIDE EFFECTS:  The primary side effect of this medication is stomach irritation, less so with the tablet. Some people find stomach irritation decreased by taking the medication with food and avoiding lying down for at least a half hour after consumption.    If you develop diarrhea, nausea, vomiting, stomach pain, fluid retention, convulsions, unusual weakness, mental confusion, tingling or numbness of the hands or feet or other potential side effects, notify your doctor or pharmacist promptly.                    *This is a summary and does not contain all possible information about this  product. For complete information about this product or your specific health needs, ask your healthcare professional. Always seek the advice of your healthcare professional if you have any questions about this product or your medical condition.

## 2021-06-13 NOTE — PROGRESS NOTES
"Assessment/Plan:        Diagnoses and all orders for this visit:    Calculus of kidney    Hypocitraturia  -     potassium citrate (UROCIT-K) 10 mEq (1,080 mg) SR tablet; Take 2 tablets (20 mEq total) by mouth daily.  Dispense: 360 tablet; Refill: 0      Stone Management Plan  [facility] 2/24/2020 3/9/2020 10/21/2020   Urinary Tract Infection No suspicion of infection No suspicion of infection No suspicion of infection   Renal Colic Asymptomatic at this time Asymptomatic at this time Asymptomatic at this time   Renal Failure No suspicion of renal failure No suspicion of renal failure No suspicion of renal failure   Current CT date 2/24/2020 - -   Right sided stones? Yes - -   R Number of ureteral stones No ureteral stones - -   R Number of kidney stones  Renal stones unchanged from last exam - -   R GSD of kidney stones < 2 - -   R Hydronephrosis None - -   R Stone Event No current event No current event No current event   R Current Plan Observe - Observe   Observe rationale Limited stone burden with good prognosis for spontaneous passage - Limited stone burden with good prognosis for spontaneous passage   Left sided stones? No - -   L Number of ureteral stones - - -   L GSD of ureteral stones - - -   L Location of ureteral stone - - -   L Number of kidney stones  - - -   L GSD of kidney stones - - -   L Hydronephrosis None - -   L Stone Event New stone passed prior to visit No current event No current event   Diagnosis date - - -   Initial location of primary symptomatic stone - - -   Initial GSD of primary symptomatic stone 5 - -   Resolved date 1/26/2020 - -   Post-op status - - -   L Current Plan - - -   Clear rationale - - -             Phone call duration: 11 minutes    Amy Lei PA-C     Subjective:      The patient has been notified of following:     \"This telephone visit will be conducted via a call between you and your physician/provider. We have found that certain health care needs can be " "provided without the need for a physical exam.  This service lets us provide the care you need with a short phone conversation.  If a prescription is necessary we can send it directly to your pharmacy.  If labs and/or imaging are needed, we can place orders so you can have the test (s) done at a later time.    If during the course of the call the physician/provider feels a telephone visit is not appropriate, you will not be charged for this service.\"     HPI  Ms. Kami Gilman is a 40 y.o.  female who is being evaluated via a billable telephone visit by [facility] [place] for ongoing management of stone risk reduction.     She is a first time calcium oxalate and calcium phosphate (apatite) stone former who has required stone clearance procedures. She has previously participated in stone risk evaluation and remains adherent to recommendations. She has identified modifiable stone risks including:  thiazide responsive hypercalciuria and hypocitraturia. She has no identified non-modifiable stone risk factors.     She felt pretty good for a couple weeks and then started feeling some pressure in her throat. She describes it as a tightness as if someone is grabbing her throat. She stopped the chlorthalidone for a week as previously instructed with the symptom improving. She also notes intermittent restlessness.     She is asymptomatic at present. She denies symptoms of fever, chills, flank pain, nausea, vomiting, urinary frequency and dysuria.    Evaluation of serum lab results demonstrates low but normal serum potassium (3.5).    PLAN    41 yo F first time calcium based kidney stone former with thiazide responsive hypercalciuria and hypocitraturia. Hypokalemia resolved on potassium supplement. Tiny right renal stone.     Given concern of medication intolerance and requirement for high potassium supplementation, we will stop thiazide and potassium chloride. She will continue on low dose " potassium citrate (20 mEq daily), as she has several pills remaining.     She will transition to long term management and return in 6 months with repeat imaging. She will maintain increased fluid consumption to generate at least 2,000 ml urine daily and maintain restricted daily sodium intake to less than 2,500 mg/day.       ROS   Review of systems is negative except for HPI.    Past Medical History:   Diagnosis Date     Kidney stone      Motion sickness        Past Surgical History:   Procedure Laterality Date     NO PAST SURGERIES       CO CYSTO/URETERO W/LITHOTRIPSY &INDWELL STENT INSRT Left 8/3/2018    Procedure: CYSTOSCOPY, LEFT URETEROSCOPY, LASER LITHOTRIPSY STENT INSERTION;  Surgeon: Prasanna Nelson MD;  Location: [facility];  Service: Urology     WISDOM TOOTH EXTRACTION         Current Outpatient Medications   Medication Sig Dispense Refill     chlorthalidone (HYGROTEN) 25 MG tablet Take 1 tablet (25 mg total) by mouth daily. 180 tablet 1     potassium chloride (KLOR-CON) 10 MEQ CR tablet 2 TABS BY MOUTH EVER 2 HRS FOR 4 DOSES, THEN 2 TABS 2 TIMES A DAY FOR 5 DAYS, THEN 2 TABS DAILY. 88 tablet 0     potassium citrate (UROCIT-K) 10 mEq (1,080 mg) SR tablet Take 2 tablets (20 mEq total) by mouth 2 (two) times a day. 720 tablet 1     No current facility-administered medications for this visit.        Allergies   Allergen Reactions     Amoxicillin Rash       Social History     Socioeconomic History     Marital status:      Spouse name: Not on file     Number of children: Not on file     Years of education: Not on file     Highest education level: Not on file   Occupational History     Occupation: Graphic Design   Social Needs     Financial resource strain: Not on file     Food insecurity     Worry: Not on file     Inability: Not on file     Transportation needs     Medical: Not on file     Non-medical: Not on file   Tobacco Use     Smoking status: Never Smoker     Smokeless tobacco: Never Used    Substance and Sexual Activity     Alcohol use: Yes     Comment: socially 2 weekly     Drug use: No     Sexual activity: Not on file   Lifestyle     Physical activity     Days per week: Not on file     Minutes per session: Not on file     Stress: Not on file   Relationships     Social connections     Talks on phone: Not on file     Gets together: Not on file     Attends Church service: Not on file     Active member of club or organization: Not on file     Attends meetings of clubs or organizations: Not on file     Relationship status: Not on file     Intimate partner violence     Fear of current or ex partner: Not on file     Emotionally abused: Not on file     Physically abused: Not on file     Forced sexual activity: Not on file   Other Topics Concern     Not on file   Social History Narrative     Not on file       Family History   Problem Relation Age of Onset     Kidney cancer Maternal Aunt      Heart disease Paternal Grandmother      Urolithiasis Neg Hx      Clotting disorder Neg Hx      Gout Neg Hx        Objective:      Labs   Acute Labs   Renal Panel  [facility]  Creatinine   Date Value Ref Range Status   12/02/2020 0.81 0.60 - 1.10 mg/dL Final   10/28/2020 0.88 0.60 - 1.10 mg/dL Final   10/16/2020 0.75 0.60 - 1.10 mg/dL Final     Potassium   Date Value Ref Range Status   12/02/2020 3.5 3.5 - 5.0 mmol/L Final   11/16/2020 3.6 3.5 - 5.0 mmol/L Final   11/03/2020 3.4 (L) 3.5 - 5.0 mmol/L Final     Calcium   Date Value Ref Range Status   12/02/2020 8.8 8.5 - 10.5 mg/dL Final   10/28/2020 9.3 8.5 - 10.5 mg/dL Final   10/16/2020 8.4 (L) 8.5 - 10.5 mg/dL Final

## 2021-06-13 NOTE — PATIENT INSTRUCTIONS - HE
INCREASING FLUIDS TO DECREASE RISK    Low Urinary Volume:     Kidney stones form because there is not enough water to dissolve the concentrated chemicals and minerals in urine.     Studies have found that people who make kidney stones should drink enough fluids so that they produce at least 2 liters (2 quarts) of urine per day.     Studies have shown that virtually every fluid you might drink decreases the risk of stone formation. Acceptable fluids include milk, coffee, diet and regular sodas, alcoholic beverages, fruit juices and even plain old water.    Increasing fluid intake will increase urine volume and decrease the chance of kidney stone formation.    Fluids:    Anything liquid that fits in a glass counts.     One way to gauge if your body has enough fluids is to check the color of your urine. If the urine is dark and yellow you do not have enough fluids. Urine will be pale yellow to clear if your body has enough fluids.    What we need to survive:    Most fluid we drink is lost through evaporation, more if active in hot humid environments    Body wastes can be cleared in a small amount of urine    All fluid that is consumed in excess of necessary losses  dilutes the urine    Ways to Increase Fluids Daily:    Drink more fluids throughout the day and into the evening. (You may need to awake from sleep to urinate which is a good indication of volume status)    Keep your refrigerator stocked with beverages you like    Drink a variety of fluids - mix it up    Add another beverage to your regular beverage at every meal    Keep a beverage at your desk (work area) and finish it before you leave for breaks, meetings, lunch and end of day    Use larger volume glasses    Whenever you pass a water fountain - stop and drink    Drink a beverage with snacks, if it is a salty snack, double the beverage    Take medication with a full glass of water/fluid    Keep a bottle of water in your car and drink every 20 miles    Eat  high water content fruits (melons, oranges, grapes, etc.)    Flavor water with a squeeze of lemon or lime or Crystal Light     If you do something repetitive throughout the day, link it with having something to drink    When you give your child/children something to drink, you have something to drink also    [facility] [place] can respond to your questions or concerns 24 hours a day at [phone].

## 2021-06-16 PROBLEM — R82.994 HYPERCALCIURIA: Status: ACTIVE | Noted: 2018-10-01

## 2021-06-16 PROBLEM — N20.0 CALCULUS OF KIDNEY: Status: ACTIVE | Noted: 2018-07-30

## 2021-06-16 PROBLEM — R82.998 HIGH URINE SODIUM: Status: ACTIVE | Noted: 2018-10-01

## 2021-06-16 PROBLEM — R34 LOW URINE OUTPUT: Status: ACTIVE | Noted: 2018-10-01

## 2021-06-19 NOTE — PROGRESS NOTES
Assessment/Plan:        Diagnoses and all orders for this visit:    Calculus of ureter  -     Urinalysis Macroscopic  -     Culture, Urine  -     ciprofloxacin HCl tablet 250 mg (CIPRO); Take 1 tablet (250 mg total) by mouth once.  -     Cystoscopy with Stent Removal Education  -     CT Abdomen Pelvis Without Oral Without IV Contrast; Future; Expected date: 9/9/18  -     Patient Stated Goal: Prevent further stones    Other orders  -     ciprofloxacin HCl (CIPRO) 250 MG tablet;       Stone Management Plan  [facility] 7/30/2018 8/10/2018   Urinary Tract Infection No suspicion of infection No suspicion of infection   Renal Colic Asymptomatic at this time Well controlled symptoms   Renal Failure No suspicion of renal failure No suspicion of renal failure   Current CT date 7/26/2018 -   Right sided stones? Yes -   R Number of ureteral stones No ureteral stones -   R Number of kidney stones  1 -   R GSD of kidney stones < 2 -   R Hydronephrosis None -   R Stone Event No current event No current event   R Current Plan Observe -   Observe rationale Limited stone burden with good prognosis for spontaneous passage -   Left sided stones? Yes -   L Number of ureteral stones 1 -   L GSD of ureteral stones 7 -   L Location of ureteral stone Proximal -   L Number of kidney stones  No renal stones -   L GSD of kidney stones N/A -   L Hydronephrosis Mild -   L Stone Event New event Established event   Diagnosis date 7/26/2018 -   Initial location of primary symptomatic stone Proximal -   Initial GSD of primary symptomatic stone 7 -   Post-op status - Stent Removal   L Current Plan Clear -   Clear rationale Poor prognosis -             Subjective:      HPI  Ms. Kami Gilman is a 38 y.o.  female returning to the [facility] [place] for stent removal postop 8/3/2018 left ureteroscopy laser lithotripsy proximal ureteral stone with Dr. Nelson.  Stone analysis 70% calcium phosphate appetite and 30%  calcium oxalate.  Patient had some urgency and frequency but otherwise is been working in skating doing more driving type skating.  UA today specific gravity 1.020 large blood pH 7 negative nitrate small leukocyte.    Flexible cystourethroscopy accomplished with stent removed without difficulty.  Impression postop laser lithotripsy left ureteral stone analysis as above    Plan follow-up 4 weeks with UA and CT and disposition regarding stone risk studies.         ROS   Review of systems is negative except for HPI.    Past Medical History:   Diagnosis Date     Kidney stone      Motion sickness        Past Surgical History:   Procedure Laterality Date     NO PAST SURGERIES       SD CYSTO/URETERO W/LITHOTRIPSY &INDWELL STENT INSRT Left 8/3/2018    Procedure: CYSTOSCOPY, LEFT URETEROSCOPY, LASER LITHOTRIPSY STENT INSERTION;  Surgeon: Prasanna Nelson MD;  Location: [facility];  Service: Urology     WISDOM TOOTH EXTRACTION         Current Outpatient Prescriptions   Medication Sig Dispense Refill     acetaminophen (TYLENOL) 500 MG tablet Take 500 mg by mouth every 6 (six) hours as needed for pain.       dimenhyDRINATE (DRAMAMINE) 50 mg Chew Chew 50 mg at bedtime as needed.       ibuprofen (ADVIL,MOTRIN) 400 MG tablet Take 400 mg by mouth every 6 (six) hours as needed for pain.       multivitamin (ONE A DAY) per tablet Take 1 tablet by mouth daily. Gummies       oxyCODONE (ROXICODONE) 5 MG immediate release tablet Take 1-2 tablets (5-10 mg total) by mouth every 4 (four) hours as needed for pain. 15 tablet 0     tamsulosin (FLOMAX) 0.4 mg cap Take 1 capsule (0.4 mg total) by mouth daily. 30 capsule 0     Current Facility-Administered Medications   Medication Dose Route Frequency Provider Last Rate Last Dose     ciprofloxacin HCl (CIPRO) 250 MG tablet                Allergies   Allergen Reactions     Amoxicillin Rash       Social History     Social History     Marital status:      Spouse name: N/A     Number  of children: N/A     Years of education: N/A     Occupational History     Graphic Design      Social History Main Topics     Smoking status: Never Smoker     Smokeless tobacco: Never Used     Alcohol use Yes      Comment: socially 2 weekly     Drug use: No     Sexual activity: Not on file     Other Topics Concern     Not on file     Social History Narrative       Family History   Problem Relation Age of Onset     Kidney cancer Maternal Aunt      Heart disease Paternal Grandmother      Urolithiasis Neg Hx      Clotting disorder Neg Hx      Gout Neg Hx      Objective:      Physical Exam  Vitals:    08/10/18 0837   BP: (!) 141/97   Pulse: 77   Temp: 97.9  F (36.6  C)     General - well developed, well nourished, appropriate for age. Appears no distress at this time  Abdomen - mildly obese soft, non-tender, no hepatosplenomegaly, no masses.   - no flank tenderness, no suprapubic tenderness, kidney and bladder non-palpable  MSK - normal spinal curvature. no spinal tenderness. normal gait. muscular strength intact.  Psych - oriented to time, place, and person, normal mood and affect.      Labs   Urinalysis POC (Office):  Nitrite, UA   Date Value Ref Range Status   08/10/2018 Negative Negative Final   07/30/2018 Negative Negative Final   07/26/2018 Negative Negative Final       Lab Urinalysis:  Blood, UA   Date Value Ref Range Status   08/10/2018 Large (!) Negative Final   07/30/2018 Negative Negative Final   07/26/2018 Negative Negative Final     Nitrite, UA   Date Value Ref Range Status   08/10/2018 Negative Negative Final   07/30/2018 Negative Negative Final   07/26/2018 Negative Negative Final     Leukocytes, UA   Date Value Ref Range Status   08/10/2018 Small (!) Negative Final   07/30/2018 Negative Negative Final   07/26/2018 Negative Negative Final     pH, UA   Date Value Ref Range Status   08/10/2018 7.0 5.0 - 8.0 Final   07/30/2018 7.0 5.0 - 8.0 Final   07/26/2018 6.0 5.0 - 8.0 Final

## 2021-06-19 NOTE — PROGRESS NOTES
ASSESSMENT & PLAN:  1. Gross hematuria, left-sided flank pain  Suspect stone.  CT advised and scheduled.  Repeat UA today as well as CBC, CMP, repeat CK level, I suspect might have been an incidental elevated finding.  Lipase today as well, although with hematuria I am more suspicious of nephrolithiasis than GI origin of pain.  Next step is likely urology consult but will first review CT scan results when available.  Discussed signs and symptoms for which to be reevaluated in the meantime.  - CT Abdomen Pelvis Without Oral Without IV Contrast; Future  - Urinalysis-UC if Indicated  - Comprehensive Metabolic Panel  - HM1(CBC and Differential)  - CK Total  - Lipase  - HM1 (CBC with Diff)    ADDENDUM: Received call from radiologist.  Patient has a 4 x 4 by 3 mm stone, mild obstructive findings.  I called patient on her cell phone and gave her results.  Recommend she use NSAIDs such as ibuprofen or Aleve as needed for pain.  If pain becomes severe, a prescription of Percocet was also prescribed, precautions and risks discussed.  Flomax 0.4 mg daily to help facilitate passage prescribed.  Discussed signs or symptoms for which to be reevaluated in the emergency room including nausea vomiting, inability to urinate, severe pain, inability to tolerate oral intake, fevers or chills.  Referral placed to [facility] [place] and patient was given the phone number for scheduling.      There are no Patient Instructions on file for this visit.    Orders Placed This Encounter   Procedures     CT Abdomen Pelvis Without Oral Without IV Contrast     Standing Status:   Future     Standing Expiration Date:   7/26/2019     Order Specific Question:   Reason for Exam (Describe Symptoms):     Answer:   left flank pain, hematuria     Order Specific Question:   Is the patient pregnant?     Answer:   No     Order Specific Question:   Can the procedure be changed per Radiologist protocol?     Answer:   Yes     Order Specific Question:   If  this is a diagnostic procedure, have the patient's age and recent imaging history been considered?     Answer:   Yes     Urinalysis- if Indicated     Comprehensive Metabolic Panel     CK Total     Lipase     HM1 (CBC with Diff)     There are no discontinued medications.    No Follow-up on file.    CHIEF COMPLAINT:  Chief Complaint   Patient presents with     Follow-up     [facility] seen 07/24/18.  OV in care everywhere.     Flank Pain     LT side pain and decreased appetite last 2 days.       HISTORY OF PRESENT ILLNESS:  Kami is a 38 y.o. female presenting to the clinic today for follow-up regarding an episode of gross hematuria as well as ongoing left flank and upper abdominal pain.  She states that 3 days ago she had an episode of gross hematuria.  This was the third time in the last 6 months she has had gross hematuria.  The first episode happened after a flank injury during roller [place] so she attributed it to that.  The second episode happened after high intensity workouts and she attributed to dehydration and high intensity workout.  Each of these episodes lasted just 1 day.  3 days ago she had her third episode that just lasted 1-2 voids.  The following day after that episode of hematuria she awoke with back pain which progressed throughout the morning.  Pain worsened and she also had some left upper abdomen pain that wrapped around to the back.  She had to leave work early due to the pain.  She had a very uncomfortable drive home.  Cannot find a comfortable position.  [facility]  Her assessment there was workable for microscopic hematuria and a mildly elevated CK level.  BMP was normal.  It was felt to be possible nephrolithiasis versus rhabdomyolysis.  She was instructed to follow-up with PCP.  Patient is not currently established with a primary care provider.    Since her urgent care visit 2 days ago, she continues to have left-sided flank and upper abdominal pain.  " He has had no further hematuria.  No fevers.  She is voiding normally.  Pushing fluids.  Denies any recent trauma, injury, or large contusions.  No nausea or vomiting but her appetite is decreased.  She has been taking Tylenol around-the-clock to help with her pain.  She slept all day yesterday feeling very rundown.    REVIEW OF SYSTEMS:   All other systems are negative.  Denies any chance of pregnancy currently.  Last menstrual period 2 weeks ago.  Denies vaginal bleeding and feels confident that blood was in the urine.    PFSH:  Maternal aunt recently  of kidney cancer.  No personal history of prior diagnosis of renal stones.  Generally healthy, no significant health history.  Lifetime non-smoker.    TOBACCO USE:  History   Smoking Status     Never Smoker   Smokeless Tobacco     Never Used       VITALS:  Vitals:    18 1430   BP: 122/80   Patient Site: Right Arm   Patient Position: Sitting   Cuff Size: Adult Large   Pulse: 64   Resp: 16   Temp: 98.5  F (36.9  C)   TempSrc: Oral   Weight: 222 lb 1 oz (100.7 kg)   Height: 5' 7.7\" (1.72 m)     Wt Readings from Last 3 Encounters:   18 222 lb 1 oz (100.7 kg)     Body mass index is 34.06 kg/(m^2).    PHYSICAL EXAM:  GENERAL: Pleasant, well-appearing patient in no acute distress.   HEENT: Pupils equal round reactive to light. Sclerae and conjunctivae clear. Oropharynx is clear with moist mucous membranes.   NECK: Supple without lymphadenopathy  CARDIOVASCULAR: Heart regular rate and rhythm without murmur normal S1-S2   ABDOMEN: Soft, nontender, nondistended.  No guarding or rebound.  No organomegaly or masses appreciated.  BACK: No significant CVA tenderness  LUNGS: Clear to auscultation bilaterally, good air movement throughout   EXTREMITIES Warm and well-perfused without edema. Pedal pulses palpable and symmetric bilaterally   NEURO: Alert and oriented. Grossly nonfocal.   PSYCHIATRIC: Presents on time and well groomed. Normal speech and thought " content. Full affect. No abnormal movements or behaviors noted.      QUALITY MEASURES:  The following high BMI interventions were performed this visit: encouragement to exercise      MEDICATIONS:  Current Outpatient Prescriptions   Medication Sig Dispense Refill     acetaminophen (TYLENOL) 500 MG tablet Take 500 mg by mouth every 6 (six) hours as needed for pain.       No current facility-administered medications for this visit.

## 2021-06-19 NOTE — ANESTHESIA CARE TRANSFER NOTE
Last vitals:   Vitals:    08/03/18 1324   BP: 127/81   Pulse: 67   Resp: 14   Temp: 36.9  C (98.4  F)   SpO2: 100%     Patient's level of consciousness is drowsy  Spontaneous respirations: yes  Maintains airway independently: yes  Dentition unchanged: yes  Oropharynx: oropharynx clear of all foreign objects    QCDR Measures:  ASA# 20 - Surgical Safety Checklist: WHO surgical safety checklist completed prior to induction  PQRS# 430 - Adult PONV Prevention: 4558F - Pt received => 2 anti-emetic agents (different classes) preop & intraop  ASA# 8 - Peds PONV Prevention: NA - Not pediatric patient, not GA or 2 or more risk factors NOT present  PQRS# 424 - [place]-op Temp Management: 4559F - At least one body temp DOCUMENTED => 35.5C or 95.9F within required timeframe  PQRS# 426 - PACU Transfer Protocol: - Transfer of care checklist used  ASA# 14 - Acute Post-op Pain: ASA14B - Patient did NOT experience pain >= 7 out of 10

## 2021-06-19 NOTE — PROGRESS NOTES
Assessment/Plan:        Diagnoses and all orders for this visit:    Calculus of ureter  -     Place sequential compression device; Standing  -     Insert and maintain IV; Standing  -     lidocaine 10 mg/mL (1 %) injection 0.1-0.3 mL; Inject 0.1-0.3 mL under the skin as needed (for IV insertion).  -     sodium chloride flush 3 mL (NS); Infuse 3 mL into a venous catheter Line Care.  -     NaCl 0.9% IR 0.9 % irrigation solution 1,000 mL (NS); Irrigate with 1,000 mL as directed once.  -     oxyCODONE (ROXICODONE) 5 MG immediate release tablet; Take 1-2 tablets (5-10 mg total) by mouth every 4 (four) hours as needed for pain.  Dispense: 15 tablet; Refill: 0  -     Ureteroscopy Education  -     Verify informed consent; Standing  -     Diet NPO; Standing  -     XR Abdomen AP; Standing  -     levoFLOXacin 500 mg/100 mL IVPB 500 mg (LEVAQUIN); Infuse 100 mL (500 mg total) into a venous catheter 60 minutes before surgery or procedure for Prior to Procedure (On Call to OR).    Hydronephrosis with urinary obstruction due to ureteral calculus    Calculus of kidney    Urinary tract stones  -     Urinalysis Macroscopic    Other orders  -     ibuprofen (ADVIL,MOTRIN) 400 MG tablet; Take 400 mg by mouth every 6 (six) hours as needed for pain.  -     multivitamin (ONE A DAY) per tablet; Take 1 tablet by mouth daily. Gummies      Stone Management Plan  [facility] Stone Management 7/30/2018   Urinary Tract Infection No suspicion of infection   Renal Colic Asymptomatic at this time   Renal Failure No suspicion of renal failure   Current CT date 7/26/2018   Right sided stones? Yes   R Number of ureteral stones No ureteral stones   R Number of kidney stones  1   R GSD of kidney stones < 2   R Hydronephrosis None   R Stone Event No current event   R Current Plan Observe   Observe rationale Limited stone burden with good prognosis for spontaneous passage   Left sided stones? Yes   L Number of ureteral stones 1   L GSD of ureteral stones 7   L  Location of ureteral stone Proximal   L Number of kidney stones  No renal stones   L GSD of kidney stones N/A   L Hydronephrosis Mild   L Stone Event New event   Diagnosis date 7/26/2018   Initial location of primary symptomatic stone Proximal   Initial GSD of primary symptomatic stone 7   L Current Plan Clear   Clear rationale Poor prognosis         Subjective:      HPI  Ms. Kami Gilman is a 38 y.o.  female presenting to the [facility] [place] referred by PCP clinic for left urolithiasis.    She is a first time unidentified composition stone former who has not required stone clearance procedures. She has not previously participated in stone risk evaluation. She has no identified modifiable stone risk factors. She has no identified non-modifiable stone risk factors.    Primary symptom occurring last week was acute onset left flank pain x few hours. The pain was constant and stabbing, fluctuating in intensity. It radiated to the left abdomen, reaching 7/10 at its worst. She required regular doses of Tylenol for pain control. She remarks an episode of hematuria 1 day preceding the pain. She also recalls a couple episodes of hematuria in March and May that resolved spontaneously. She was evaluated by PCP office last week with labs demonstrating mild renal insufficiency and leukocytosis with no overt signs of infection. A CT scan was obtained 7/26 notable for an obstructing left ureteral stone.     She is asymptomatic at present. She denies symptoms of fever, chills, flank pain, nausea, vomiting, urinary frequency and dysuria. 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     CT scan is personally reviewed and demonstrates a mildly obstructing 7 x 5 mm left proximal ureteral stone. There is a tiny right lower pole papillary tip calcification.    Significant labs from presentation include no hematuria, no pyuria, negative nitrite,  no bacteria, elevated WBC, slightly elevated creatinine and normal potassium.    PLAN    39 yo F first time stone former with obstructing left proximal ureteral stone, currently asymptomatic. Tiny right renal stone.     Discussed options for management including trial of passage versus surgical stone clearance. Given size of stone, prognosis for passage is fair-poor and she would like to avoid interruption with her upcoming trip/tournament.    Will proceed with ureterscopic stone clearance this week. Risks and benefits were detailed of ureteroscopic stone clearance including potential issues of urinary or systemic infection, ureteral injury, inaccessible stone, incomplete stone clearance, multiple surgeries, and stent related symptoms of urgency, frequency and hematuria Patient verbalized understanding. Patient agrees with plan as discussed. Preoperative evaluation with primary care is not requested as the referring documentation is adequate.    For symptom control, she was prescribed oxycodone and Flomax. Over the counter symptom control medications of ibuprofen, Dramamine and Tylenol were recommended.    Patient also seen and examined by Amy Lei PA-C       ROS   Review of Systems  A 12 point comprehensive review of systems is negative except for HPI    History reviewed. No pertinent past medical history.    Past Surgical History:   Procedure Laterality Date     NO PAST SURGERIES         Current Outpatient Prescriptions   Medication Sig Dispense Refill     ibuprofen (ADVIL,MOTRIN) 400 MG tablet Take 400 mg by mouth every 6 (six) hours as needed for pain.       multivitamin (ONE A DAY) per tablet Take 1 tablet by mouth daily. Gummies       tamsulosin (FLOMAX) 0.4 mg cap Take 1 capsule (0.4 mg total) by mouth daily. 30 capsule 0     acetaminophen (TYLENOL) 500 MG tablet Take 500 mg by mouth every 6 (six) hours as needed for pain.       oxyCODONE-acetaminophen (PERCOCET) 5-325 mg per tablet Take 1 tablet by mouth  every 6 (six) hours as needed for pain. 13 tablet 0     No current facility-administered medications for this visit.        Allergies   Allergen Reactions     Amoxicillin Rash       Social History     Social History     Marital status:      Spouse name: N/A     Number of children: N/A     Years of education: N/A     Occupational History     Graphic Design      Social History Main Topics     Smoking status: Never Smoker     Smokeless tobacco: Never Used     Alcohol use Yes      Comment: socially     Drug use: No     Sexual activity: Not on file     Other Topics Concern     Not on file     Social History Narrative       Family History   Problem Relation Age of Onset     Kidney cancer Maternal Aunt      Heart disease Paternal Grandmother      Urolithiasis Neg Hx      Clotting disorder Neg Hx      Gout Neg Hx        Objective:      Physical Exam  Vitals:    07/30/18 1439   BP: 157/83   Pulse: (!) 55   Temp: 97.9  F (36.6  C)     General - well developed, well nourished, appropriate for age. Appears no distress at this time   Heart - regular rate and rhythm, no murmur  Respiratory - normal effort, clear to auscultation, good air entry without adventitious noises  Abdomen - mildly obese soft, non-tender, no hepatosplenomegaly, no masses.   - no flank tenderness, no suprapubic tenderness, kidney and bladder non-palpable  MSK - normal spinal curvature. no spinal tenderness. normal gait. muscular strength intact.  Neurology - cranial nerves II-XII grossly intact, normal sensation, no unsteadiness  Skin - intact, no bruising, no gouty tophi  Psych - oriented to time, place, and person, normal mood and affect.      Labs  Urinalysis POC (Office):  Nitrite, UA   Date Value Ref Range Status   07/30/2018 Negative Negative Final   07/26/2018 Negative Negative Final       Lab Urinalysis:  Blood, UA   Date Value Ref Range Status   07/30/2018 Negative Negative Final   07/26/2018 Negative Negative Final     Nitrite, UA   Date  Value Ref Range Status   07/30/2018 Negative Negative Final   07/26/2018 Negative Negative Final     Leukocytes, UA   Date Value Ref Range Status   07/30/2018 Negative Negative Final   07/26/2018 Negative Negative Final     pH, UA   Date Value Ref Range Status   07/30/2018 7.0 5.0 - 8.0 Final   07/26/2018 6.0 5.0 - 8.0 Final    and Acute Labs   CBC   WBC   Date Value Ref Range Status   07/26/2018 14.3 (H) 4.0 - 11.0 thou/uL Final     Hemoglobin   Date Value Ref Range Status   07/26/2018 13.2 12.0 - 16.0 g/dL Final     Platelets   Date Value Ref Range Status   07/26/2018 197 140 - 440 thou/uL Final    and Renal Panel  [facility]  Creatinine   Date Value Ref Range Status   07/26/2018 1.43 (H) 0.60 - 1.10 mg/dL Final     Potassium   Date Value Ref Range Status   07/26/2018 4.7 3.5 - 5.0 mmol/L Final     Calcium   Date Value Ref Range Status   07/26/2018 9.7 8.5 - 10.5 mg/dL Final

## 2021-06-19 NOTE — ANESTHESIA POSTPROCEDURE EVALUATION
Patient: Kami Gilman  CYSTOSCOPY, LEFT URETEROSCOPY, LASER LITHOTRIPSY STENT INSERTION  Anesthesia type: general    Patient location: PACU  Last vitals:   Vitals:    08/03/18 1345   BP: (!) 143/91   Pulse: 61   Resp: 14   Temp:    SpO2: 100%     Post vital signs: stable  Level of consciousness: awake and responds to simple questions  Post-anesthesia pain: pain controlled  Post-anesthesia nausea and vomiting: no  Pulmonary: unassisted, return to baseline  Cardiovascular: stable and blood pressure at baseline  Hydration: adequate  Anesthetic events: no    QCDR Measures:  ASA# 11 - [place]-op Cardiac Arrest: ASA11B - Patient did NOT experience unanticipated cardiac arrest  ASA# 12 - [place]-op Mortality Rate: ASA12B - Patient did NOT die  ASA# 13 - PACU Re-Intubation Rate: ASA13B - Patient did NOT require a new airway mgmt  ASA# 10 - Composite Anes Safety: ASA10A - No serious adverse event    Additional Notes:

## 2021-06-19 NOTE — ANESTHESIA PREPROCEDURE EVALUATION
Anesthesia Evaluation      Patient summary reviewed   No history of anesthetic complications     Airway   Mallampati: I  Neck ROM: full   Pulmonary - negative ROS and normal exam                          Cardiovascular - negative ROS and normal exam   Neuro/Psych - negative ROS     Endo/Other    (+) obesity,      Comments: History of motion sickness.    GI/Hepatic/Renal - negative ROS   (+)   chronic renal disease,           Dental - normal exam                        Anesthesia Plan  Planned anesthetic: general LMA  50 mg ketamine IV on induction.    ASA 2   Induction: intravenous   Anesthetic plan and risks discussed with: patient  Anesthesia plan special considerations: antiemetics,   Post-op plan: routine recovery

## 2021-06-20 NOTE — PROGRESS NOTES
Assessment/Plan:        Diagnoses and all orders for this visit:    Hypercalciuria  -     Stone Formation, 24 Hour Urine x1; Future; Expected date: 10/15/18  -     Patient Stated Goal: Prevent further stones  -     24 Hour Urine Specimen Collections (Sodium Restriction) Education    High urine sodium  -     Stone Formation, 24 Hour Urine x1; Future; Expected date: 10/15/18  -     24 Hour Urine Specimen Collections (Sodium Restriction) Education    Low urine output    Calculus of urinary tract  -     Urinalysis Macroscopic    Urinary calculus      Stone Management Plan  [facility] Stone Management 7/30/2018 8/10/2018 9/10/2018   Urinary Tract Infection No suspicion of infection No suspicion of infection No suspicion of infection   Renal Colic Asymptomatic at this time Well controlled symptoms Asymptomatic at this time   Renal Failure No suspicion of renal failure No suspicion of renal failure No suspicion of renal failure   Current CT date 7/26/2018 - 9/10/2018   Right sided stones? Yes - Yes   R Number of ureteral stones No ureteral stones - No ureteral stones   R Number of kidney stones  1 - Renal stones unchanged from last exam   R GSD of kidney stones < 2 - < 2   R Hydronephrosis None - None   R Stone Event No current event No current event No current event   R Current Plan Observe - Observe   Observe rationale Limited stone burden with good prognosis for spontaneous passage - Limited stone burden with good prognosis for spontaneous passage   Left sided stones? Yes - No   L Number of ureteral stones 1 - -   L GSD of ureteral stones 7 - -   L Location of ureteral stone Proximal - -   L Number of kidney stones  No renal stones - -   L GSD of kidney stones N/A - -   L Hydronephrosis Mild - None   L Stone Event New event Established event Resolved event   Diagnosis date 7/26/2018 - -   Initial location of primary symptomatic stone Proximal - -   Initial GSD of primary symptomatic stone 7 - -   Resolved date - - 9/10/2018    Post-op status - Stent Removal No residual stone   L Current Plan Clear - -   Clear rationale Poor prognosis - -         Subjective:      HPI  Ms. Kami Gilman is a 38 y.o.  female returning to the [facility] [place] for review of initial stone risk evaluation.     She is a first time calcium oxalate and calcium phosphate (apatite) stone former who has required stone clearance procedures. She has not previously participated in stone risk evaluation. She has identified modifiable stone risks including:  low urine volume, unclassified hypercalciuria and high urine sodium. She has no identified non-modifiable stone risk factors.     She is asymptomatic at present. She denies symptoms of fever, chills, flank pain, nausea, vomiting, urinary frequency and dysuria.     Evaluation of serum lab results demonstrates slightly elevated PTH, normal venous calcium, and normal ionized calcium. Two 24 hour urine collections demonstrates mild low urine volume (2300, 1600 mL), creatinine (1944, 1904 mg), severe hypercalciuria (474, 378 mg), moderate high urine sodium (313, 107 mmol), citrate (636, 273 mg), oxalate (41, 21 mg), and pH (6.5, 6). Dietary journal demonstrates: moderate sodium consumption and convenience food diet.    PLAN    37 yo F first time calcium phosphate stone former with initial risk evaluation notable for hypercalciuria with fluctuant high urine sodium, and low urine volume. Elevated PTH with normal ionized and serum calcium. Tiny right renal stone, asymptomatic.    Based on review of findings with the patient, she will repeat a single 24 hour collection after a 4 day sodium restriction diet. She will initiate increased fluid consumption to generate at least 2,000 ml urine daily and initiate restricted daily sodium intake to less than 1,800 mg/day.    Patient also seen and examined by DIETER Diaz   Review of systems is negative except for HPI.    Past Medical History:    Diagnosis Date     Kidney stone      Motion sickness        Past Surgical History:   Procedure Laterality Date     NO PAST SURGERIES       OK CYSTO/URETERO W/LITHOTRIPSY &INDWELL STENT INSRT Left 8/3/2018    Procedure: CYSTOSCOPY, LEFT URETEROSCOPY, LASER LITHOTRIPSY STENT INSERTION;  Surgeon: Prasanna Nelson MD;  Location: [facility];  Service: Urology     WISDOM TOOTH EXTRACTION         No current outpatient prescriptions on file.     No current facility-administered medications for this visit.        Allergies   Allergen Reactions     Amoxicillin Rash       Social History     Social History     Marital status:      Spouse name: N/A     Number of children: N/A     Years of education: N/A     Occupational History     Graphic Design      Social History Main Topics     Smoking status: Never Smoker     Smokeless tobacco: Never Used     Alcohol use Yes      Comment: socially 2 weekly     Drug use: No     Sexual activity: Not on file     Other Topics Concern     Not on file     Social History Narrative       Family History   Problem Relation Age of Onset     Kidney cancer Maternal Aunt      Heart disease Paternal Grandmother      Urolithiasis Neg Hx      Clotting disorder Neg Hx      Gout Neg Hx      Objective:      Physical Exam  Vitals:    10/01/18 1520   BP: (!) 140/92   Temp: 97.5  F (36.4  C)     General - well developed, well nourished, appropriate for age. Appears no distress at this time  Abdomen - moderately obese soft, non-tender, no hepatosplenomegaly, no masses.   - no flank tenderness, no suprapubic tenderness, kidney and bladder non-palpable  MSK - normal spinal curvature. no spinal tenderness. normal gait. muscular strength intact.  Psych - oriented to time, place, and person, normal mood and affect.    Labs   Urinalysis POC (Office):  Nitrite, UA   Date Value Ref Range Status   10/01/2018 Negative Negative Final   09/10/2018 Negative Negative Final   08/10/2018 Negative Negative  Final       Lab Urinalysis:  Blood, UA   Date Value Ref Range Status   10/01/2018 Negative Negative Final   09/10/2018 Negative Negative Final   08/10/2018 Large (!) Negative Final     Nitrite, UA   Date Value Ref Range Status   10/01/2018 Negative Negative Final   09/10/2018 Negative Negative Final   08/10/2018 Negative Negative Final     Leukocytes, UA   Date Value Ref Range Status   10/01/2018 Trace (!) Negative Final   09/10/2018 Negative Negative Final   08/10/2018 Small (!) Negative Final     pH, UA   Date Value Ref Range Status   10/01/2018 7.0 5.0 - 8.0 Final   09/10/2018 7.5 5.0 - 8.0 Final   08/10/2018 7.0 5.0 - 8.0 Final    and Stone prevention labs   Calcium metabolism   Calcium, Ionized Measured   Date Value Ref Range Status   09/10/2018 1.15 1.11 - 1.30 mmol/L Final      PTH   Date Value Ref Range Status   09/10/2018 109 (H) 10 - 86 pg/mL Final     No results found for: [id number]  and 24 hour urine   Calcium, 24H Urine   Date Value Ref Range Status   09/24/2018 474 (H) 20 - 275 mg/24hr Final     Comment:       Hypercalciuria >350  Values are for persons with  average daily calcium intake  (600-800 mg/day)   09/17/2018 378 (H) 20 - 275 mg/24hr Final     Comment:       Hypercalciuria >350  Values are for persons with  average daily calcium intake  (600-800 mg/day)     Sodium, 24 Hour Urine   Date Value Ref Range Status   09/24/2018 313 (H) 40 - 217 mmol/24hr Final   09/17/2018 107 40 - 217 mmol/24hr Final     Citrate, 24 Hour Urine   Date Value Ref Range Status   09/24/2018 636 278 - 1191 mg/24hr Final     Comment:       Reference Ranges are not  established for 0-19 years  or >60 years of age.   09/17/2018 273 (L) 278 - 1191 mg/24hr Final     Comment:       Reference Ranges are not  established for 0-19 years  or >60 years of age.     Oxalate, 24 Hour Urine   Date Value Ref Range Status   09/24/2018 41.2 7.0 - 44.0 mg/24hr Final   09/17/2018 20.8 7.0 - 44.0 mg/24hr Final     pH, Urine   Date Value  Ref Range Status   09/24/2018 6.5 4.5 - 8.0 Final   09/17/2018 6.0 4.5 - 8.0 Final     Urine Volume   Date Value Ref Range Status   09/24/2018 2300 mL Final   09/17/2018 1600 mL Final

## 2021-06-20 NOTE — PROGRESS NOTES
Assessment/Plan:        Diagnoses and all orders for this visit:    Calculus of ureter    Calculus of kidney  -     Stone Formation, 24 Hour Urine x2; Standing  -     Calcium, Ionized, Measured; Future; Expected date: 9/24/18  -     Parathyroid Hormone Intact with Minerals; Future; Expected date: 9/24/18  -     Patient Stated Goal: Prevent further stones  -     24 Hour Urine Collection Steps Education    Urinary tract stones  -     Urinalysis Macroscopic    Urinary calculus      Stone Management Plan  \[facility] [place] [facility]\"" Stone Management 7/30/2018 8/10/2018 9/10/2018   Urinary Tract Infection No suspicion of infection No suspicion of infection No suspicion of infection   Renal Colic Asymptomatic at this time Well controlled symptoms Asymptomatic at this time   Renal Failure No suspicion of renal failure No suspicion of renal failure No suspicion of renal failure   Current CT date 7/26/2018 - 9/10/2018   Right sided stones? Yes - Yes   R Number of ureteral stones No ureteral stones - No ureteral stones   R Number of kidney stones  1 - Renal stones unchanged from last exam   R GSD of kidney stones < 2 - < 2   R Hydronephrosis None - None   R Stone Event No current event No current event No current event   R Current Plan Observe - Observe   Observe rationale Limited stone burden with good prognosis for spontaneous passage - Limited stone burden with good prognosis for spontaneous passage   Left sided stones? Yes - No   L Number of ureteral stones 1 - -   L GSD of ureteral stones 7 - -   L Location of ureteral stone Proximal - -   L Number of kidney stones  No renal stones - -   L GSD of kidney stones N/A - -   L Hydronephrosis Mild - None   L Stone Event New event Established event Resolved event   Diagnosis date 7/26/2018 - -   Initial location of primary symptomatic stone Proximal - -   Initial GSD of primary symptomatic stone 7 - -   Resolved date - - 9/10/2018   Post-op status - Stent Removal No residual stone   L Current Plan  Clear - -   Clear rationale Poor prognosis - -             Subjective:      HPI  Ms. Kami Gilman is a 38 y.o.  female returning to the [facility] [place] for late postoperative follow-up.     She returns status post Left ureteroscopic laser lithotripsy for proximal ureteral stone. She has had no unanticipated events.     She is asymptomatic at present. She denies symptoms of fever, chills, flank pain, nausea, vomiting, urinary frequency and dysuria.    New CT scan was personally reviewed and demonstrates complete clearance of targeted stone with resolution of previous hydronephrosis. No change to tiny right renal lower pole stone.    Stone composition was 30 % calcium oxalate and 70 % calcium phosphate (apatite).     PLAN    37 yo F first time stone former s/p ureteroscopy for clearance of left proximal ureteral stone. Tiny right renal stone.    She is at risk for ongoing active stone disease and will initiate stone risk evaluation. Serum stone risk chemistries including parathyroid hormone and ionized calcium will be obtained before next visit. Two 24 hour urine collections and dietary journal will be obtained at earliest covenience..    Patient also seen and examined by DIETER Diaz   Review of systems is negative except for HPI.    Past Medical History:   Diagnosis Date     Kidney stone      Motion sickness        Past Surgical History:   Procedure Laterality Date     NO PAST SURGERIES       [place] CYSTO/URETERO W/LITHOTRIPSY &INDWELL STENT INSRT Left 8/3/2018    Procedure: CYSTOSCOPY, LEFT URETEROSCOPY, LASER LITHOTRIPSY STENT INSERTION;  Surgeon: Prasanna Nelson MD;  Location: [facility];  Service: Urology     WISDOM TOOTH EXTRACTION         No current outpatient prescriptions on file.     No current facility-administered medications for this visit.        Allergies   Allergen Reactions     Amoxicillin Rash       Social History     Social History     Marital  status:      Spouse name: N/A     Number of children: N/A     Years of education: N/A     Occupational History     Graphic Design      Social History Main Topics     Smoking status: Never Smoker     Smokeless tobacco: Never Used     Alcohol use Yes      Comment: socially 2 weekly     Drug use: No     Sexual activity: Not on file     Other Topics Concern     Not on file     Social History Narrative       Family History   Problem Relation Age of Onset     Kidney cancer Maternal Aunt      Heart disease Paternal Grandmother      Urolithiasis Neg Hx      Clotting disorder Neg Hx      Gout Neg Hx      Objective:      Physical Exam  Vitals:    09/10/18 0906   BP: 136/90   Pulse: 67   Temp: 97.9  F (36.6  C)     General - well developed, well nourished, appropriate for age. Appears no distress at this time  Abdomen - mildly obese soft, non-tender, no hepatosplenomegaly, no masses.   - no flank tenderness, no suprapubic tenderness, kidney and bladder non-palpable  MSK - normal spinal curvature. no spinal tenderness. normal gait. muscular strength intact.  Psych - oriented to time, place, and person, normal mood and affect.      Labs   Urinalysis POC (Office):  Nitrite, UA   Date Value Ref Range Status   09/10/2018 Negative Negative Final   08/10/2018 Negative Negative Final   07/30/2018 Negative Negative Final       Lab Urinalysis:  Blood, UA   Date Value Ref Range Status   09/10/2018 Negative Negative Final   08/10/2018 Large (!) Negative Final   07/30/2018 Negative Negative Final     Nitrite, UA   Date Value Ref Range Status   09/10/2018 Negative Negative Final   08/10/2018 Negative Negative Final   07/30/2018 Negative Negative Final     Leukocytes, UA   Date Value Ref Range Status   09/10/2018 Negative Negative Final   08/10/2018 Small (!) Negative Final   07/30/2018 Negative Negative Final     pH, UA   Date Value Ref Range Status   09/10/2018 7.5 5.0 - 8.0 Final   08/10/2018 7.0 5.0 - 8.0 Final   07/30/2018 7.0  5.0 - 8.0 Final

## 2021-06-21 NOTE — PROGRESS NOTES
Assessment/Plan:        Diagnoses and all orders for this visit:    Hypercalciuria  -     Patient Stated Goal: Prevent further stones  -     Foods Rich in Sodium Salt Education    High urine sodium  -     Patient Stated Goal: Prevent further stones  -     Foods Rich in Sodium Salt Education    Calculus of kidney  -     CT Abdomen Pelvis Without Oral Without IV Contrast; Future; Expected date: 11/6/19  -     Patient Increasing Fluids Education    Urinary tract stones  -     Urinalysis Macroscopic      Stone Management Plan  [facility] Stone Management 9/10/2018 10/1/2018 11/6/2018   Urinary Tract Infection No suspicion of infection No suspicion of infection No suspicion of infection   Renal Colic Asymptomatic at this time Asymptomatic at this time Asymptomatic at this time   Renal Failure No suspicion of renal failure No suspicion of renal failure No suspicion of renal failure   Current CT date 9/10/2018 - -   Right sided stones? Yes - -   R Number of ureteral stones No ureteral stones - -   R Number of kidney stones  Renal stones unchanged from last exam - -   R GSD of kidney stones < 2 - -   R Hydronephrosis None - -   R Stone Event No current event No current event No current event   R Current Plan Observe Observe Observe   Observe rationale Limited stone burden with good prognosis for spontaneous passage Limited stone burden with good prognosis for spontaneous passage Limited stone burden with good prognosis for spontaneous passage   Left sided stones? No - -   L Number of ureteral stones - - -   L GSD of ureteral stones - - -   L Location of ureteral stone - - -   L Number of kidney stones  - - -   L GSD of kidney stones - - -   L Hydronephrosis None - -   L Stone Event Resolved event No current event No current event   Diagnosis date - - -   Initial location of primary symptomatic stone - - -   Initial GSD of primary symptomatic stone - - -   Resolved date 9/10/2018 - -   Post-op status No residual stone - -   L  Current Plan - - -   Clear rationale - - -         Subjective:      HPI  Ms. Kami Gilman is a 38 y.o.  female returning to the [facility] [place] for ongoing management of stone risk reduction.     She is a first time calcium oxalate and calcium phosphate (apatite) stone former who has required stone clearance procedures. She has previously participated in stone risk evaluation and remains actively engaged in stone risk reduction. She has identified modifiable stone risks including:  low urine volume, unclassified hypercalciuria and high urine sodium. She has no identified non-modifiable stone risk factors.     She is asymptomatic at present. She denies symptoms of fever, chills, flank pain, nausea, vomiting, urinary frequency and dysuria.    Objectives for this encounter include evaluating impact of sodium restriction challenge; she feels she was successful in maintaining low sodium intake;.    One 24 hour urine collection demonstrates urine volume (2125 mL), creatinine (1828 mg), normalized calcium (244 mg, previously 474, 378 mg), undetectable sodium (NA), citrate (778 mg), oxalate (36 mg), and pH (6.5). Dietary journal demonstrates: is not available for review.    PLAN    39 yo F first time calcium phosphate stone former with follow risk evaluation demonstrating sodium dependent hypercalciuria, normalized with sodium restriction; normalized low urine volume. Tiny right renal stone, asymptomatic.    Based on review of lab results with the patient, she will transition to long term management and return in 12 months with repeat imaging. She will maintain increased fluid consumption to generate at least 2,000 ml urine daily and maintain restricted daily sodium intake to less than 2,500 mg/day.    Patient also seen and examined by DIETER Diaz   Review of systems is negative except for HPI.    Past Medical History:   Diagnosis Date     Kidney stone      Motion sickness         Past Surgical History:   Procedure Laterality Date     NO PAST SURGERIES       AL CYSTO/URETERO W/LITHOTRIPSY &INDWELL STENT INSRT Left 8/3/2018    Procedure: CYSTOSCOPY, LEFT URETEROSCOPY, LASER LITHOTRIPSY STENT INSERTION;  Surgeon: Prasanna Nelson MD;  Location: [facility];  Service: Urology     WISDOM TOOTH EXTRACTION         No current outpatient prescriptions on file.     No current facility-administered medications for this visit.        Allergies   Allergen Reactions     Amoxicillin Rash       Social History     Social History     Marital status:      Spouse name: N/A     Number of children: N/A     Years of education: N/A     Occupational History     Graphic Design      Social History Main Topics     Smoking status: Never Smoker     Smokeless tobacco: Never Used     Alcohol use Yes      Comment: socially 2 weekly     Drug use: No     Sexual activity: Not on file     Other Topics Concern     Not on file     Social History Narrative       Family History   Problem Relation Age of Onset     Kidney cancer Maternal Aunt      Heart disease Paternal Grandmother      Urolithiasis Neg Hx      Clotting disorder Neg Hx      Gout Neg Hx      Objective:      Physical Exam  Vitals:    11/06/18 1549   BP: (!) 133/91   Pulse: 63   Temp: 97.8  F (36.6  C)     General - well developed, well nourished, appropriate for age. Appears no distress at this time  Abdomen - mildly obese soft, non-tender, no hepatosplenomegaly, no masses.   - no flank tenderness, no suprapubic tenderness, kidney and bladder non-palpable  MSK - normal spinal curvature. no spinal tenderness. normal gait. muscular strength intact.  Psych - oriented to time, place, and person, normal mood and affect.      Labs   Urinalysis POC (Office):  Nitrite, UA   Date Value Ref Range Status   11/06/2018 Negative Negative Final   10/01/2018 Negative Negative Final   09/10/2018 Negative Negative Final       Lab Urinalysis:  Blood, UA   Date  "Value Ref Range Status   11/06/2018 Negative Negative Final   10/01/2018 Negative Negative Final   09/10/2018 Negative Negative Final     Nitrite, UA   Date Value Ref Range Status   11/06/2018 Negative Negative Final   10/01/2018 Negative Negative Final   09/10/2018 Negative Negative Final     Leukocytes, UA   Date Value Ref Range Status   11/06/2018 Negative Negative Final   10/01/2018 Trace (!) Negative Final   09/10/2018 Negative Negative Final     pH, UA   Date Value Ref Range Status   11/06/2018 6.0 5.0 - 8.0 Final   10/01/2018 7.0 5.0 - 8.0 Final   09/10/2018 7.5 5.0 - 8.0 Final    and Stone prevention labs   24 hour urine   Calcium, 24H Urine   Date Value Ref Range Status   10/29/2018 244 20 - 275 mg/24hr Final     Comment:       Hypercalciuria >350  Values are for persons with  average daily calcium intake  (600-800 mg/day)   09/24/2018 474 (H) 20 - 275 mg/24hr Final     Comment:       Hypercalciuria >350  Values are for persons with  average daily calcium intake  (600-800 mg/day)   09/17/2018 378 (H) 20 - 275 mg/24hr Final     Comment:       Hypercalciuria >350  Values are for persons with  average daily calcium intake  (600-800 mg/day)     Sodium, 24 Hour Urine   Date Value Ref Range Status   10/29/2018  40 - 217 mmol/24hr Final     Comment:     \"Unable to calculate:  Urine Sodium value below detectable level\"     09/24/2018 313 (H) 40 - 217 mmol/24hr Final   09/17/2018 107 40 - 217 mmol/24hr Final     Citrate, 24 Hour Urine   Date Value Ref Range Status   10/29/2018 778 278 - 1191 mg/24hr Final     Comment:       Reference Ranges are not  established for 0-19 years  or >60 years of age.   09/24/2018 636 278 - 1191 mg/24hr Final     Comment:       Reference Ranges are not  established for 0-19 years  or >60 years of age.   09/17/2018 273 (L) 278 - 1191 mg/24hr Final     Comment:       Reference Ranges are not  established for 0-19 years  or >60 years of age.     Oxalate, 24 Hour Urine   Date Value Ref " Range Status   10/29/2018 36.1 7.0 - 44.0 mg/24hr Final   09/24/2018 41.2 7.0 - 44.0 mg/24hr Final   09/17/2018 20.8 7.0 - 44.0 mg/24hr Final     pH, Urine   Date Value Ref Range Status   10/29/2018 6.5 4.5 - 8.0 Final   09/24/2018 6.5 4.5 - 8.0 Final   09/17/2018 6.0 4.5 - 8.0 Final     Urine Volume   Date Value Ref Range Status   10/29/2018 2125 mL Final   09/24/2018 2300 mL Final   09/17/2018 1600 mL Final

## 2021-06-26 ENCOUNTER — HEALTH MAINTENANCE LETTER (OUTPATIENT)
Age: 41
End: 2021-06-26

## 2021-07-03 NOTE — ADDENDUM NOTE
Addendum Note by Chitra Pineda MD at 7/26/2018 11:18 PM     Author: Chitra Pineda MD Service: -- Author Type: Physician    Filed: 7/26/2018 11:18 PM Encounter Date: 7/26/2018 Status: Signed    : Chitra Pineda MD (Physician)    Addended by: CHITRA PINEDA on: 7/26/2018 11:18 PM        Modules accepted: Orders

## 2021-07-03 NOTE — ADDENDUM NOTE
Addendum Note by Parker Gerardo CRNA at 8/3/2018  2:09 PM     Author: Parker Gerardo CRNA Service: -- Author Type: Nurse Anesthetist    Filed: 8/3/2018  2:09 PM Date of Service: 8/3/2018  2:09 PM Status: Signed    : Parker Gerardo CRNA (Nurse Anesthetist)       Addendum  created 08/03/18 1409 by Parker Gerardo CRNA    Anesthesia Intra Meds edited

## 2021-10-16 ENCOUNTER — HEALTH MAINTENANCE LETTER (OUTPATIENT)
Age: 41
End: 2021-10-16

## 2022-07-17 ENCOUNTER — HEALTH MAINTENANCE LETTER (OUTPATIENT)
Age: 42
End: 2022-07-17

## 2022-09-25 ENCOUNTER — HEALTH MAINTENANCE LETTER (OUTPATIENT)
Age: 42
End: 2022-09-25

## 2023-08-05 ENCOUNTER — HEALTH MAINTENANCE LETTER (OUTPATIENT)
Age: 43
End: 2023-08-05

## 2024-03-02 ENCOUNTER — HEALTH MAINTENANCE LETTER (OUTPATIENT)
Age: 44
End: 2024-03-02